# Patient Record
Sex: FEMALE | Race: WHITE | NOT HISPANIC OR LATINO | Employment: FULL TIME | URBAN - METROPOLITAN AREA
[De-identification: names, ages, dates, MRNs, and addresses within clinical notes are randomized per-mention and may not be internally consistent; named-entity substitution may affect disease eponyms.]

---

## 2018-03-27 NOTE — PROGRESS NOTES
Subjective:     Encounter Date:03/28/2018    Patient ID: Kandice Manning is a 57 y.o. 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    HEALTHCARE PROVIDER: MARIANA Cartagena  INFECTIOUS DISEASE: Liborio Cao MD  GASTROENTEROLOGIST: Steve Mccain MD    Chief Complaint:   Chief Complaint   Patient presents with   • Dizziness   • Irregular Heart Beat   • Palpitations     Problem List:  1. Intermittent palpitations with semi-recent decongestant use, mild caffeine use, residual class I symptoms with normal EKG  2. Hyperlipidemia; ASCVD 10 year risk 1.9%, 1.7% if treated  3. IBS  4. Chronic H pylori infections  5. GERD  6. Former tobacco abuse; 1 pack per week ×3 years, quit in 1998  7. Plantar fasciitis  8. Situational stress  9. Surgical history: WILL    Allergies   Allergen Reactions   • Sulfa Antibiotics Other (See Comments)     Pass out   • Tylenol [Acetaminophen] Other (See Comments)     Pass out         Current Outpatient Prescriptions:   •  cetirizine (zyrTEC) 10 MG tablet, Take 25 mg by mouth Daily., Disp: , Rfl:   •  DONNATAL 16.2 MG per tablet, , Disp: , Rfl: 0  •  fluticasone (FLONASE) 50 MCG/ACT nasal spray, SHAKE LQ AND U 1 SPR IEN QD, Disp: , Rfl: 5  •  LINZESS 145 MCG capsule capsule, TK 1 C PO D, Disp: , Rfl: 11  •  pantoprazole (PROTONIX) 40 MG EC tablet, Take  by mouth Daily., Disp: , Rfl: 3  •  RaNITidine HCl (ZANTAC PO), Take  by mouth As Needed., Disp: , Rfl:   •  vitamin B-6 (PYRIDOXINE) 50 MG tablet, Take 50 mg by mouth Daily., Disp: , Rfl:   •  vitamin E 400 UNIT capsule, Take 400 Units by mouth Daily., Disp: , Rfl:     History of Present Illness  This is a 57-year-old white female who presents to establish cardiology care today for intermittent palpitations.  Her palpitations last just a few seconds and are associated with mild dizziness.  She has experienced these for the past 3-4 weeks.  She denies any chest pain, shortness of breath,  presyncope, syncope, edema, COPD, asthma, MIs, heart catheterizations, heart monitors, cardiomegaly, valvular dysfunction, or thyroid dysfunction.  She drinks 3 cups of coffee daily and semi-recently has taken a decongestant for sinus related issues.  She is very active and walks at least 5 miles per day without difficulties.  She has had increased stress in her life for the past few months.  Her mother passed away after having a 77-day hospitalization in December 2017, her brother passed away from a heart attack in January 2018, and her other brother, who has a history of colon cancer, was just diagnosed with metastatic colon cancer to the lung as well.  She states that she is unsure whether her symptoms are related to anxiety from all the stress or if they are heart related.  She has been told she has a heart murmur in the past.  She denies any hypertension, hyperlipidemia, or type 2 diabetes mellitus.  With her only pregnancy, she had some PIH but denies any preeclampsia or gestational diabetes.  She has GERD, as well as chronic H pylori.  She states that she cannot get rid of H. pylori and is seen by Dr. Cao. She has stopped taking Donnatal and has not noticed as many palpitations.  Patient otherwise denies chest pain, shortness of breath, PND, edema, palpitations, syncope or presyncope at this time.    Cardiovascular Disease Risk Factors  family history, increased age    Social History     Social History   • Marital status: Significant Other     Spouse name: N/A   • Number of children: 1   • Years of education: N/A     Occupational History   • Not on file.     Social History Main Topics   • Smoking status: Former Smoker     Packs/day: 0.25     Types: Cigarettes     Quit date: 3/28/1998   • Smokeless tobacco: Never Used   • Alcohol use No   • Drug use: No   • Sexual activity: Defer     Other Topics Concern   • Not on file     Social History Narrative   • No narrative on file       Family History   Problem  "Relation Age of Onset   • Heart disease Mother    • Heart disease Father    • Heart attack Brother    • Heart disease Other    • Heart attack Other    · Mother: , CABG, atrial fibrillation  · Father: CHF, CABG  · Brother:  from MI at 47  · Brother:  at 63 from MI  · Brother: Colon and lung cancer  · Daughter: Healthy    Review of Systems   Constitution: Positive for diaphoresis and malaise/fatigue.   HENT: Negative.    Eyes: Negative.    Cardiovascular: Positive for irregular heartbeat and palpitations. Negative for chest pain, claudication, dyspnea on exertion, leg swelling, near-syncope, orthopnea and syncope.   Respiratory: Negative for shortness of breath and sleep disturbances due to breathing.    Endocrine: Negative.    Hematologic/Lymphatic: Negative.    Skin: Negative.    Musculoskeletal: Negative.    Gastrointestinal: Positive for heartburn.        IBS, chronic h pylori infections   Neurological: Positive for light-headedness. Negative for dizziness and seizures.   Psychiatric/Behavioral: The patient is nervous/anxious.    Allergic/Immunologic: Positive for environmental allergies.      Obtained and negative except as outlined in problem list and HPI.    Procedures None       Objective:       Vitals:    18 0855 18 0904 18 0905 18 0906   BP: 128/83 129/70 131/74 146/80   BP Location: Right arm Right arm Left arm Left arm   Patient Position: Sitting Standing Standing Standing   Pulse: 79 83 82 88   Weight: 62.1 kg (137 lb)      Height: 160 cm (63\")        Body mass index is 24.27 kg/m².     Physical Exam   Constitutional: She is oriented to person, place, and time. She appears well-developed and well-nourished.   HENT:   Mouth/Throat: Uvula is midline, oropharynx is clear and moist and mucous membranes are normal. She has dentures (upper). No oral lesions. Abnormal dentition. No dental abscesses, uvula swelling, lacerations or dental caries.   Lower anterior " teeth in acceptable repair   Eyes:   Fundoscopic exam:       The right eye shows no AV nicking, no exudate and no hemorrhage.        The left eye shows no AV nicking, no exudate and no hemorrhage.   Neck: No JVD present. Carotid bruit is not present. No thyromegaly present.   Cardiovascular: Regular rhythm, S1 normal and S2 normal.  Exam reveals no gallop, no S3 and no friction rub.    Murmur heard.   Medium-pitched harsh early systolic murmur is present with a grade of 1/6  at the upper right sternal border  Pulses:       Dorsalis pedis pulses are 1+ on the right side, and 1+ on the left side.        Posterior tibial pulses are 1+ on the right side, and 1+ on the left side.   Pulmonary/Chest: Effort normal and breath sounds normal. She has no wheezes. She has no rhonchi. She has no rales.   Abdominal: Soft. She exhibits no mass. There is no hepatosplenomegaly. There is no tenderness. There is no guarding.   Bowel sounds audible x4   Musculoskeletal: Normal range of motion. She exhibits no edema.   Lymphadenopathy:     She has no cervical adenopathy.   Neurological: She is alert and oriented to person, place, and time.   Skin: Skin is warm, dry and intact. No rash noted.   Vitals reviewed.      Lab Review:   · ECG 3/1/18: Normal sinus rhythm, normal ECG, 68 bpm, QRS 92 ms,  ms,  ms  · CBC: WBC 5.1, RBC 4.48, hemoglobin 14.3, hematocrit 42.6, MCV 85, MCH 32, MCHC 34, RDW 12, MPV 9.1, platelets 227, neutrophils 62.9, lymphocytes 24.5, monocytes 10.1, eos 1.6, basos 0.9  · Vitamin D 55  · Lipid panel: HDL 76, triglycerides 55, cholesterol 201,   · CMP: Glucose 96, BNP 13, creatinine 0.72, GFR 93, sodium 145, potassium 4.5, chloride 108, carbon dioxide 24, calcium 9.8, protein 7, albumin 4.5, globulin 2.3, bilirubin 0.8, alkaline phosphatase 50, AST 21, AST 17      Assessment:       Patient with intermittent palpitations associated with dizziness and strong family history of early CAD. We will order  an exercise stress test and an echocardiogram, and place a ZioXT after her stress test and echo are complete to assess for any arrhythmias, pauses, or PAF. We will start nebivolol 2.5 mg daily for palpitations.      Diagnosis Plan   1. Palpitations  Adult Transthoracic Echo Complete W/ Cont if Necessary Per Protocol    Holter Monitor - 72 Hour Up To 21 Days    Treadmill Stress Test   2. Mixed hyperlipidemia  Healthy heart diet stressed and recommended   3. Family history of early CAD  Screening studies as outlined above; defer cardiac CT calcium score at this time          Plan:   1. Patient to continue current medications and close follow up with the above providers.  2. Tentative cardiology follow up in 6 weeks or patient may return sooner PRN.  3. Echocardiogram and stress test ordered  4. ZioXT  5. Nebivolol 2.5 mg daily  6. 1 800 card provided    Scribed for Tony Dominguez MD by Lizette Quinn, MARIANA. 3/28/2018  10:14 AM    I, Tony Dominguez MD, [facility], personally performed the services described in this documentation as scribed by the above named individual in my presence, and it is both accurate and complete. At 10:42 AM on 03/28/2018

## 2018-03-28 ENCOUNTER — CONSULT (OUTPATIENT)
Dept: CARDIOLOGY | Facility: CLINIC | Age: 58
End: 2018-03-28

## 2018-03-28 VITALS
SYSTOLIC BLOOD PRESSURE: 146 MMHG | HEIGHT: 63 IN | WEIGHT: 137 LBS | DIASTOLIC BLOOD PRESSURE: 80 MMHG | HEART RATE: 88 BPM | BODY MASS INDEX: 24.27 KG/M2

## 2018-03-28 DIAGNOSIS — R00.2 PALPITATIONS: Primary | ICD-10-CM

## 2018-03-28 DIAGNOSIS — E78.2 MIXED HYPERLIPIDEMIA: ICD-10-CM

## 2018-03-28 DIAGNOSIS — Z82.49 FAMILY HISTORY OF EARLY CAD: ICD-10-CM

## 2018-03-28 PROBLEM — K21.9 GASTROESOPHAGEAL REFLUX DISEASE WITHOUT ESOPHAGITIS: Status: ACTIVE | Noted: 2018-03-28

## 2018-03-28 PROCEDURE — 99204 OFFICE O/P NEW MOD 45 MIN: CPT | Performed by: INTERNAL MEDICINE

## 2018-03-28 RX ORDER — LINACLOTIDE 145 UG/1
CAPSULE, GELATIN COATED ORAL
Refills: 11 | COMMUNITY
Start: 2018-02-21

## 2018-03-28 RX ORDER — CETIRIZINE HYDROCHLORIDE 10 MG/1
25 TABLET ORAL AS NEEDED
COMMUNITY

## 2018-03-28 RX ORDER — FLUTICASONE PROPIONATE 50 MCG
SPRAY, SUSPENSION (ML) NASAL
Refills: 5 | COMMUNITY
Start: 2018-03-13

## 2018-03-28 RX ORDER — LANOLIN ALCOHOL/MO/W.PET/CERES
50 CREAM (GRAM) TOPICAL DAILY
COMMUNITY
End: 2019-08-14

## 2018-03-28 RX ORDER — VITAMIN E 268 MG
400 CAPSULE ORAL DAILY
COMMUNITY
End: 2023-03-08 | Stop reason: SDDI

## 2018-03-28 RX ORDER — PANTOPRAZOLE SODIUM 40 MG/1
TABLET, DELAYED RELEASE ORAL DAILY
Refills: 3 | COMMUNITY
Start: 2018-02-05

## 2018-03-28 RX ORDER — NEBIVOLOL 2.5 MG/1
2.5 TABLET ORAL DAILY
Qty: 30 TABLET | Refills: 6 | Status: SHIPPED | OUTPATIENT
Start: 2018-03-28 | End: 2018-04-30 | Stop reason: SDUPTHER

## 2018-03-28 RX ORDER — PHENOBARBITAL, HYOSCYAMINE SULFATE, ATROPINE SULFATE, SCOPOLAMINE HYDROBROMIDE 16.2; .1037; .0194; .0065 MG/1; MG/1; MG/1; MG/1
1 TABLET ORAL AS NEEDED
Refills: 0 | COMMUNITY
Start: 2018-02-01 | End: 2023-03-28

## 2018-04-25 ENCOUNTER — HOSPITAL ENCOUNTER (OUTPATIENT)
Dept: CARDIOLOGY | Facility: HOSPITAL | Age: 58
Discharge: HOME | End: 2018-04-25
Attending: INTERNAL MEDICINE

## 2018-04-25 ENCOUNTER — HOSPITAL ENCOUNTER (OUTPATIENT)
Dept: CARDIOLOGY | Facility: HOSPITAL | Age: 58
Discharge: HOME | End: 2018-04-25
Attending: INTERNAL MEDICINE | Admitting: INTERNAL MEDICINE

## 2018-04-25 VITALS
BODY MASS INDEX: 24.27 KG/M2 | SYSTOLIC BLOOD PRESSURE: 140 MMHG | WEIGHT: 137 LBS | DIASTOLIC BLOOD PRESSURE: 83 MMHG | HEIGHT: 63 IN

## 2018-04-25 DIAGNOSIS — R00.2 PALPITATIONS: ICD-10-CM

## 2018-04-25 LAB
BH CV ECHO MEAS - AO MAX PG (FULL): 0.38 MMHG
BH CV ECHO MEAS - AO MAX PG: 5 MMHG
BH CV ECHO MEAS - AO MEAN PG (FULL): 0.63 MMHG
BH CV ECHO MEAS - AO MEAN PG: 2.7 MMHG
BH CV ECHO MEAS - AO ROOT AREA (BSA CORRECTED): 2
BH CV ECHO MEAS - AO ROOT AREA: 8.4 CM^2
BH CV ECHO MEAS - AO ROOT DIAM: 3.3 CM
BH CV ECHO MEAS - AO V2 MAX: 112.9 CM/SEC
BH CV ECHO MEAS - AO V2 MEAN: 74.4 CM/SEC
BH CV ECHO MEAS - AO V2 VTI: 22.3 CM
BH CV ECHO MEAS - AVA(I,A): 2.8 CM^2
BH CV ECHO MEAS - AVA(I,D): 2.8 CM^2
BH CV ECHO MEAS - AVA(V,A): 2.8 CM^2
BH CV ECHO MEAS - AVA(V,D): 2.8 CM^2
BH CV ECHO MEAS - BSA(HAYCOCK): 1.7 M^2
BH CV ECHO MEAS - BSA: 1.6 M^2
BH CV ECHO MEAS - BZI_BMI: 24.3 KILOGRAMS/M^2
BH CV ECHO MEAS - BZI_METRIC_HEIGHT: 160 CM
BH CV ECHO MEAS - BZI_METRIC_WEIGHT: 62.1 KG
BH CV ECHO MEAS - EDV(CUBED): 69.3 ML
BH CV ECHO MEAS - EDV(TEICH): 74.5 ML
BH CV ECHO MEAS - EF(CUBED): 77.1 %
BH CV ECHO MEAS - EF(TEICH): 69.7 %
BH CV ECHO MEAS - ESV(CUBED): 15.8 ML
BH CV ECHO MEAS - ESV(TEICH): 22.6 ML
BH CV ECHO MEAS - FS: 38.9 %
BH CV ECHO MEAS - IVS/LVPW: 1.1
BH CV ECHO MEAS - IVSD: 0.9 CM
BH CV ECHO MEAS - LA DIMENSION: 2.8 CM
BH CV ECHO MEAS - LA/AO: 0.85
BH CV ECHO MEAS - LAT PEAK E' VEL: 11 CM/SEC
BH CV ECHO MEAS - LV MASS(C)D: 109.9 GRAMS
BH CV ECHO MEAS - LV MASS(C)DI: 66.7 GRAMS/M^2
BH CV ECHO MEAS - LV MAX PG: 4.6 MMHG
BH CV ECHO MEAS - LV MEAN PG: 2 MMHG
BH CV ECHO MEAS - LV V1 MAX: 107.5 CM/SEC
BH CV ECHO MEAS - LV V1 MEAN: 64.6 CM/SEC
BH CV ECHO MEAS - LV V1 VTI: 20.9 CM
BH CV ECHO MEAS - LVIDD: 4.1 CM
BH CV ECHO MEAS - LVIDS: 2.5 CM
BH CV ECHO MEAS - LVOT AREA (M): 2.8 CM^2
BH CV ECHO MEAS - LVOT AREA: 3 CM^2
BH CV ECHO MEAS - LVOT DIAM: 1.9 CM
BH CV ECHO MEAS - LVPWD: 0.9 CM
BH CV ECHO MEAS - MED PEAK E' VEL: 7.58 CM/SEC
BH CV ECHO MEAS - MV A MAX VEL: 71.3 CM/SEC
BH CV ECHO MEAS - MV E MAX VEL: 85 CM/SEC
BH CV ECHO MEAS - MV E/A: 1.2
BH CV ECHO MEAS - PA ACC SLOPE: 842 CM/SEC^2
BH CV ECHO MEAS - PA ACC TIME: 0.13 SEC
BH CV ECHO MEAS - PA MAX PG: 5 MMHG
BH CV ECHO MEAS - PA PR(ACCEL): 20.4 MMHG
BH CV ECHO MEAS - PA V2 MAX: 111.8 CM/SEC
BH CV ECHO MEAS - RAP SYSTOLE: 3 MMHG
BH CV ECHO MEAS - RVSP: 26 MMHG
BH CV ECHO MEAS - SI(AO): 113.8 ML/M^2
BH CV ECHO MEAS - SI(CUBED): 32.5 ML/M^2
BH CV ECHO MEAS - SI(LVOT): 37.7 ML/M^2
BH CV ECHO MEAS - SI(TEICH): 31.6 ML/M^2
BH CV ECHO MEAS - SV(AO): 187.4 ML
BH CV ECHO MEAS - SV(CUBED): 53.4 ML
BH CV ECHO MEAS - SV(LVOT): 62 ML
BH CV ECHO MEAS - SV(TEICH): 51.9 ML
BH CV ECHO MEAS - TAPSE (>1.6): 3 CM2
BH CV ECHO MEAS - TR MAX VEL: 240.9 CM/SEC
BH CV ECHO MEASUREMENTS AVERAGE E/E' RATIO: 9.15
BH CV STRESS BP STAGE 1: NORMAL
BH CV STRESS BP STAGE 2: NORMAL
BH CV STRESS BP STAGE 3: NORMAL
BH CV STRESS BP STAGE 4: NORMAL
BH CV STRESS DURATION MIN STAGE 1: 3
BH CV STRESS DURATION MIN STAGE 2: 3
BH CV STRESS DURATION MIN STAGE 3: 3
BH CV STRESS DURATION SEC STAGE 1: 0
BH CV STRESS DURATION SEC STAGE 2: 0
BH CV STRESS DURATION SEC STAGE 3: 0
BH CV STRESS DURATION SEC STAGE 4: 27
BH CV STRESS GRADE STAGE 1: 10
BH CV STRESS GRADE STAGE 2: 12
BH CV STRESS GRADE STAGE 3: 14
BH CV STRESS GRADE STAGE 4: 16
BH CV STRESS HR STAGE 1: 114
BH CV STRESS HR STAGE 2: 139
BH CV STRESS HR STAGE 3: 157
BH CV STRESS HR STAGE 4: 164
BH CV STRESS METS STAGE 1: 5
BH CV STRESS METS STAGE 2: 7.5
BH CV STRESS METS STAGE 3: 10
BH CV STRESS METS STAGE 4: 13.5
BH CV STRESS PROTOCOL 1: NORMAL
BH CV STRESS RECOVERY BP: NORMAL MMHG
BH CV STRESS RECOVERY HR: 96 BPM
BH CV STRESS SPEED STAGE 1: 1.7
BH CV STRESS SPEED STAGE 2: 2.5
BH CV STRESS SPEED STAGE 3: 3.4
BH CV STRESS SPEED STAGE 4: 4.2
BH CV STRESS STAGE 1: 1
BH CV STRESS STAGE 2: 2
BH CV STRESS STAGE 3: 3
BH CV STRESS STAGE 4: 4
BH CV VAS BP RIGHT ARM: NORMAL MMHG
BH CV XLRA - RV BASE: 2.7 CM
BH CV XLRA - RV LENGTH: 6.6 CM
BH CV XLRA - RV MID: 2.8 CM
BH CV XLRA - TDI S': 17.1 CM/SEC
LEFT ATRIUM VOLUME INDEX: 19.4 ML/M2
LV EF 2D ECHO EST: 68 %
MAXIMAL PREDICTED HEART RATE: 162 BPM
MAXIMAL PREDICTED HEART RATE: 162 BPM
PERCENT MAX PREDICTED HR: 104.32 %
STRESS BASELINE BP: NORMAL MMHG
STRESS BASELINE HR: 76 BPM
STRESS PERCENT HR: 123 %
STRESS POST ESTIMATED WORKLOAD: 10.8 METS
STRESS POST EXERCISE DUR MIN: 9 MIN
STRESS POST EXERCISE DUR SEC: 27 SEC
STRESS POST PEAK BP: NORMAL MMHG
STRESS POST PEAK HR: 169 BPM
STRESS TARGET HR: 138 BPM
STRESS TARGET HR: 138 BPM

## 2018-04-25 PROCEDURE — 93306 TTE W/DOPPLER COMPLETE: CPT | Performed by: INTERNAL MEDICINE

## 2018-04-25 PROCEDURE — 93306 TTE W/DOPPLER COMPLETE: CPT

## 2018-04-25 PROCEDURE — 93017 CV STRESS TEST TRACING ONLY: CPT

## 2018-04-25 PROCEDURE — 93018 CV STRESS TEST I&R ONLY: CPT | Performed by: INTERNAL MEDICINE

## 2018-04-30 ENCOUNTER — TELEPHONE (OUTPATIENT)
Dept: CARDIOLOGY | Facility: CLINIC | Age: 58
End: 2018-04-30

## 2018-04-30 RX ORDER — NEBIVOLOL 2.5 MG/1
2.5 TABLET ORAL DAILY
Qty: 90 TABLET | Refills: 3 | Status: SHIPPED | OUTPATIENT
Start: 2018-04-30 | End: 2018-05-16

## 2018-05-16 ENCOUNTER — TELEPHONE (OUTPATIENT)
Dept: CARDIOLOGY | Facility: CLINIC | Age: 58
End: 2018-05-16

## 2018-05-16 RX ORDER — ACEBUTOLOL HYDROCHLORIDE 200 MG/1
200 CAPSULE ORAL DAILY
Qty: 30 CAPSULE | Refills: 11 | Status: SHIPPED | OUTPATIENT
Start: 2018-05-16 | End: 2018-05-29

## 2018-05-16 NOTE — TELEPHONE ENCOUNTER
Patient called with complaints of her hair falling out since starting Bystolic 2.5 mg and wants to know if she can be changed something else.  She has an appointment 5/29/18.

## 2018-05-29 ENCOUNTER — LAB (OUTPATIENT)
Dept: LAB | Facility: HOSPITAL | Age: 58
End: 2018-05-29

## 2018-05-29 ENCOUNTER — OFFICE VISIT (OUTPATIENT)
Dept: CARDIOLOGY | Facility: CLINIC | Age: 58
End: 2018-05-29

## 2018-05-29 VITALS
WEIGHT: 136 LBS | SYSTOLIC BLOOD PRESSURE: 136 MMHG | BODY MASS INDEX: 24.1 KG/M2 | HEIGHT: 63 IN | HEART RATE: 67 BPM | DIASTOLIC BLOOD PRESSURE: 78 MMHG

## 2018-05-29 DIAGNOSIS — E78.2 MIXED HYPERLIPIDEMIA: ICD-10-CM

## 2018-05-29 DIAGNOSIS — K21.9 GASTROESOPHAGEAL REFLUX DISEASE WITHOUT ESOPHAGITIS: ICD-10-CM

## 2018-05-29 DIAGNOSIS — R53.83 OTHER FATIGUE: Primary | ICD-10-CM

## 2018-05-29 DIAGNOSIS — R00.2 PALPITATIONS: ICD-10-CM

## 2018-05-29 DIAGNOSIS — R53.83 OTHER FATIGUE: ICD-10-CM

## 2018-05-29 LAB — VIT B12 BLD-MCNC: 461 PG/ML (ref 211–911)

## 2018-05-29 PROCEDURE — 82607 VITAMIN B-12: CPT

## 2018-05-29 PROCEDURE — 99214 OFFICE O/P EST MOD 30 MIN: CPT | Performed by: INTERNAL MEDICINE

## 2018-05-29 PROCEDURE — 36415 COLL VENOUS BLD VENIPUNCTURE: CPT

## 2018-05-29 RX ORDER — NEBIVOLOL 2.5 MG/1
2.5 TABLET ORAL DAILY
COMMUNITY
End: 2019-05-22 | Stop reason: SDUPTHER

## 2018-05-29 NOTE — PROGRESS NOTES
Subjective:     Encounter Date:05/29/2018    Patient ID: Kandice Manning is a 58 y.o. 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     HEALTHCARE PROVIDER: MARIANA Cartagena  INFECTIOUS DISEASE: Liborio Cao MD  GASTROENTEROLOGIST: Steve Mccain MD    Chief Complaint:   Chief Complaint   Patient presents with   • Palpitations   • Hyperlipidemia   • Dizziness     Problem List:  1. Intermittent palpitations with semi-recent decongestant use, mild caffeine use, residual class I symptoms with normal EKG   A. Acceptable negative GXT following exercise to 104% PMHR and 128% predicted exercise capacity, April 2018   B. Echocardiogram demonstrating mild MR and TR without pulmonary arterial hypertension or pericardial effusion or important valvular heart disease with normal LVEF (0.68), April 2018   C. Residual class I symptoms  2. Hyperlipidemia; ASCVD 10-year risk 1.9%, 1.7% if treated  3. IBS  4. Chronic H pylori infections  5. GERD  6. Former tobacco abuse; 1 pack per week ×3 years, quit in 1998  7. Plantar fasciitis  8. Situational stress  9. Surgical history: WILL     Allergies   Allergen Reactions   • Sulfa Antibiotics Other (See Comments)     Pass out   • Tylenol [Acetaminophen] Other (See Comments)     Pass out         Current Outpatient Prescriptions:   •  cetirizine (zyrTEC) 10 MG tablet, Take 25 mg by mouth Daily., Disp: , Rfl:   •  fluticasone (FLONASE) 50 MCG/ACT nasal spray, SHAKE LQ AND U 1 SPR IEN QD, Disp: , Rfl: 5  •  LINZESS 145 MCG capsule capsule, TK 1 C PO D, Disp: , Rfl: 11  •  nebivolol (BYSTOLIC) 2.5 MG tablet, Take 2.5 mg by mouth Daily., Disp: , Rfl:   •  pantoprazole (PROTONIX) 40 MG EC tablet, Take  by mouth Daily., Disp: , Rfl: 3  •  RaNITidine HCl (ZANTAC PO), Take  by mouth As Needed., Disp: , Rfl:   •  vitamin B-6 (PYRIDOXINE) 50 MG tablet, Take 50 mg by mouth Daily., Disp: , Rfl:   •  vitamin E 400 UNIT capsule, Take 400 Units by  "mouth Daily., Disp: , Rfl:     HISTORY OF PRESENT ILLNESS: Patient returns for scheduled followup after a 2-month hiatus. She asks for interpretation of the letters sent to her regarding her tests, and this is discussed with her.  She says that she did not get to do the monitor because it was going to cost her $80 copay per day.  She has not tried the Sectral yet because of the multiple side effects; she is worried about \"excessive weight gain\" because she used to weigh 170 pounds and does not want to go back to that weight.  She wonders if she has a vitamin B12 deficiency.  She has not had anymore symptoms and states that she quit taking the Donnatal for her stomach and that her symptoms have since improved.  Patient otherwise denies chest pain, shortness of breath, PND, edema, palpitations, syncope or presyncope at this time.  She works full time daily and is asymptomatic but does request a vitamin B12 level.        Review of Systems   Constitution: Positive for malaise/fatigue.   Cardiovascular: Positive for leg swelling.      Obtained and otherwise negative except as outlined in problem list and HPI.    Procedures       Objective:       Vitals:    05/29/18 1036 05/29/18 1038 05/29/18 1051   BP: 150/79 142/76 136/78   BP Location: Left arm Left arm Left arm   Patient Position: Sitting Standing Sitting   Pulse: 64 67    Weight: 61.7 kg (136 lb) 61.7 kg (136 lb)    Height: 160 cm (63\") 160 cm (63\")      Body mass index is 24.09 kg/m².   Last weight:  137 lbs.    Physical Exam   Constitutional: She is oriented to person, place, and time. She appears well-developed and well-nourished.   Neck: No JVD present. Carotid bruit is not present. No thyromegaly present.   Cardiovascular: Regular rhythm, S1 normal, S2 normal and normal heart sounds.  Exam reveals no gallop, no S3 and no friction rub.    No murmur heard.  Pulses:       Dorsalis pedis pulses are 2+ on the right side, and 2+ on the left side.        Posterior " tibial pulses are 2+ on the right side, and 2+ on the left side.   Pulmonary/Chest: Effort normal and breath sounds normal. She has no wheezes. She has no rhonchi. She has no rales.   Abdominal: Soft. She exhibits no mass. There is no hepatosplenomegaly. There is no tenderness. There is no guarding.   Bowel sounds audible x4   Musculoskeletal: Normal range of motion. She exhibits no edema.   Lymphadenopathy:     She has no cervical adenopathy.   Neurological: She is alert and oriented to person, place, and time.   Skin: Skin is warm, dry and intact. No rash noted.   Vitals reviewed.        Lab Review: No recent laboratory studies available for review today          Assessment:   Overall continued acceptable course with no interim cardiopulmonary complaints with good functional status. We will defer additional diagnostic or therapeutic intervention from a cardiac perspective at this time.       Diagnosis Plan   1. Other fatigue  Vitamin B12   2. Palpitations  Acceptable control on nebivolol   3. Mixed hyperlipidemia  No new data to review   4. Gastroesophageal reflux disease without esophagitis  Stable clinical course currently          Plan:         1. Patient to continue current medications and close follow up with the above providers.  2. Vitamin B12 level ordered  3. Tentative cardiology follow up in November 2018, or patient may return sooner PRN.    Transcribed by Dayana Mckinley for Dr. Tony Dominguez at 10:51 AM on 05/29/2018    ITony MD, [facility], personally performed the services described in this documentation as scribed by the above named individual in my presence, and it is both accurate and complete. At 1:04 PM on 05/29/2018

## 2018-11-05 ENCOUNTER — TELEPHONE (OUTPATIENT)
Dept: CARDIOLOGY | Facility: CLINIC | Age: 58
End: 2018-11-05

## 2018-11-05 NOTE — TELEPHONE ENCOUNTER
Called and stated she needed gallbladder surgery but when I called back there was not answer.  Asked her to return my call for more info regarding her surgery.

## 2018-12-12 ENCOUNTER — OFFICE VISIT (OUTPATIENT)
Dept: CARDIOLOGY | Facility: CLINIC | Age: 58
End: 2018-12-12

## 2018-12-12 VITALS
HEIGHT: 63 IN | BODY MASS INDEX: 24.17 KG/M2 | DIASTOLIC BLOOD PRESSURE: 71 MMHG | HEART RATE: 82 BPM | SYSTOLIC BLOOD PRESSURE: 119 MMHG | WEIGHT: 136.4 LBS

## 2018-12-12 DIAGNOSIS — R00.2 PALPITATIONS: Primary | ICD-10-CM

## 2018-12-12 DIAGNOSIS — Z82.49 FAMILY HISTORY OF EARLY CAD: ICD-10-CM

## 2018-12-12 DIAGNOSIS — E78.2 MIXED HYPERLIPIDEMIA: ICD-10-CM

## 2018-12-12 PROCEDURE — 99214 OFFICE O/P EST MOD 30 MIN: CPT | Performed by: INTERNAL MEDICINE

## 2018-12-12 NOTE — PROGRESS NOTES
Subjective:     Encounter Date:12/12/2018    Patient ID: Kandice Manning is a 58 y.o. 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     HEALTHCARE PROVIDER: MARIANA Cartagena  INFECTIOUS DISEASE: Liborio Cao MD  GASTROENTEROLOGIST: Steve Mccain MD  GENERAL SURGEON: Rodríguez Villatoro MD    Chief Complaint:   Chief Complaint   Patient presents with   • Fatigue     Problem List:  1. Intermittent palpitations with decongestant use, mild caffeine use, residual class I symptoms, with normal EKG:  a. Acceptable negative GXT following exercise to 104% PMHR and 128% predicted exercise capacity, April 2018  b. Echocardiogram demonstrating mild MR and TR without pulmonary arterial hypertension or pericardial effusion or important valvular heart disease with normal LVEF (0.68), April 2018  c. Residual class I symptoms  2. Hyperlipidemia; ASCVD 10-year risk 1.9%, 1.7% if treated  3. IBS  4. Chronic H pylori infections  5. GERD  6. Former tobacco abuse; 1 pack per week ×3 years, quit in 1998  7. Plantar fasciitis  8. Situational stress  9. Right upper quadrant pain with HIDA scan demonstrating 17% function fall 2018  10. Surgical history:   a. WILL  b. Cholecystectomy, 12/4/2018     Allergies   Allergen Reactions   • Sulfa Antibiotics Other (See Comments)     Pass out   • Tylenol [Acetaminophen] Other (See Comments)     Pass out         Current Outpatient Medications:   •  cetirizine (zyrTEC) 10 MG tablet, Take 25 mg by mouth Daily., Disp: , Rfl:   •  DONNATAL 16.2 MG per tablet, , Disp: , Rfl: 0  •  fluticasone (FLONASE) 50 MCG/ACT nasal spray, SHAKE LQ AND U 1 SPR IEN QD, Disp: , Rfl: 5  •  LINZESS 145 MCG capsule capsule, TK 1 C PO D, Disp: , Rfl: 11  •  nebivolol (BYSTOLIC) 2.5 MG tablet, Take 2.5 mg by mouth Daily., Disp: , Rfl:   •  pantoprazole (PROTONIX) 40 MG EC tablet, Take  by mouth Daily., Disp: , Rfl: 3  •  RaNITidine HCl (ZANTAC PO), Take  by mouth As  "Needed., Disp: , Rfl:   •  vitamin B-6 (PYRIDOXINE) 50 MG tablet, Take 50 mg by mouth Daily., Disp: , Rfl:   •  vitamin E 400 UNIT capsule, Take 400 Units by mouth Daily., Disp: , Rfl:     HISTORY OF PRESENT ILLNESS:  Patient is here for a 7 month follow-up. She denies chest pain, SOB, palpitations, presyncope, syncope, or edema.  She feels that Bystolic has controlled her palpitations.  She was having some right upper quadrant pain with associated nausea and difficulty eating foods.  She had a HIDA scan which demonstrated 17% functioning and had a cholecystectomy on 2018 with Dr. Villatoro at [facility].  She went through surgery with no complications and has some mild abdominal distention, tenderness, and residual gas since her surgery.  She lost her mother, father, and brother this year and has had increased situational stress as a result.  She still has a lack of appetite after her surgery, but she feels that her nausea has improved.  Her mother  of complications of chronic ischemic heart disease and congestive heart failure, her brother  of sudden cardiac death, and her father  in 2018 of complications of severe ischemic cardiomyopathy and COPD with apparent sepsis, confusion, and abdominal ischemia.      Review of Systems   Musculoskeletal: Positive for back pain.      Obtained and otherwise negative except as outlined in problem list and HPI.    Procedures       Objective:       Vitals:    18 1542 18 1543   BP: 118/69 119/71   BP Location: Left arm Left arm   Patient Position: Sitting Standing   Pulse: 83 82   Weight: 61.9 kg (136 lb 6.4 oz)    Height: 160 cm (63\")      Body mass index is 24.16 kg/m².  Last weight May 2018 was 136 pounds    Physical Exam   Constitutional: She is oriented to person, place, and time. She appears well-developed and well-nourished.   Neck: No JVD present. Carotid bruit is not present. No thyromegaly present.   Cardiovascular: Regular " rhythm, S1 normal, S2 normal and normal heart sounds. Exam reveals no gallop, no S3 and no friction rub.   No murmur heard.  Pulses:       Dorsalis pedis pulses are 2+ on the right side, and 2+ on the left side.        Posterior tibial pulses are 2+ on the right side, and 2+ on the left side.   Pulmonary/Chest: Effort normal and breath sounds normal. She has no wheezes. She has no rhonchi. She has no rales.   Abdominal: Soft. She exhibits no mass. There is no hepatosplenomegaly. There is no tenderness. There is no guarding.       Bowel sounds audible x4, laparoscopic surgical incisions CDI   Musculoskeletal: Normal range of motion. She exhibits no edema.   Lymphadenopathy:     She has no cervical adenopathy.   Neurological: She is alert and oriented to person, place, and time.   Skin: Skin is warm, dry and intact. No rash noted.   Vitals reviewed.        Lab Review:   5/29/18: Vitamin B12 - 461      Assessment:       Overall continued acceptable course with no interim cardiopulmonary complaints with acceptable functional status. We will defer additional diagnostic or therapeutic intervention from a cardiac perspective at this time.  She is recovering from her recent cholecystectomy on 12/4/2018. She also has situational stress because she lost her father, mother, and brother this year.       Diagnosis Plan   1. Palpitations  Stable   2. Mixed hyperlipidemia  No new labs to review   3. Family history of early CAD  Stable          Plan:         1. Patient to continue current medications and close follow up with the above providers.  2. Tentative cardiology follow up in August 2019, or patient may return sooner PRN.    Scribed for Tony Dominguez MD by Lizette Quinn,APRN. 12/12/2018  4:00 PM    ITony MD, [facility], personally performed the services described in this documentation as scribed by the above named individual in my presence, and it is both accurate and complete. At 4:12 PM on 12/12/2018

## 2019-05-22 RX ORDER — NEBIVOLOL 2.5 MG/1
2.5 TABLET ORAL DAILY
Qty: 90 TABLET | Refills: 3 | Status: SHIPPED | OUTPATIENT
Start: 2019-05-22 | End: 2019-08-14 | Stop reason: SDUPTHER

## 2019-08-14 ENCOUNTER — OFFICE VISIT (OUTPATIENT)
Dept: CARDIOLOGY | Facility: CLINIC | Age: 59
End: 2019-08-14

## 2019-08-14 VITALS
SYSTOLIC BLOOD PRESSURE: 126 MMHG | BODY MASS INDEX: 23.83 KG/M2 | HEIGHT: 63 IN | WEIGHT: 134.5 LBS | HEART RATE: 64 BPM | DIASTOLIC BLOOD PRESSURE: 76 MMHG

## 2019-08-14 DIAGNOSIS — Z82.49 FAMILY HISTORY OF EARLY CAD: ICD-10-CM

## 2019-08-14 DIAGNOSIS — R00.2 PALPITATIONS: Primary | ICD-10-CM

## 2019-08-14 DIAGNOSIS — E78.5 DYSLIPIDEMIA: ICD-10-CM

## 2019-08-14 PROCEDURE — 99214 OFFICE O/P EST MOD 30 MIN: CPT | Performed by: INTERNAL MEDICINE

## 2019-08-14 RX ORDER — NEBIVOLOL 2.5 MG/1
2.5 TABLET ORAL DAILY
Qty: 90 TABLET | Refills: 3 | Status: SHIPPED | OUTPATIENT
Start: 2019-08-14 | End: 2020-09-14 | Stop reason: SDUPTHER

## 2019-08-14 NOTE — PROGRESS NOTES
Subjective:     Encounter Date:08/14/2019    Patient ID: Kandice Manning is a 59 y.o. 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     HEALTHCARE PROVIDER: MARIANA Cartagena  INFECTIOUS DISEASE: Liborio Cao MD  GASTROENTEROLOGIST: Steve Mccain MD  GENERAL SURGEON: Rodríguez Villatoro MD    Chief Complaint:   Chief Complaint   Patient presents with   • Palpitations     Problem List:  1. Intermittent palpitations with decongestant use, mild caffeine use, residual class I symptoms, with normal EKG:  a. Acceptable negative GXT following exercise to 104% PMHR and 128% predicted exercise capacity, April 2018  b. Echocardiogram demonstrating mild MR and TR without pulmonary arterial hypertension or pericardial effusion or important valvular heart disease with normal LVEF (0.68), April 2018  c. Residual class I symptoms  2. Hyperlipidemia; ASCVD 10-year risk 1.9%, 1.7% if treated  3. IBS  4. Chronic H. pylori infections  5. GERD  6. Former tobacco abuse; 1 pack per week ×3 years, quit in 1998  7. Plantar fasciitis  8. Situational stress with depression related to losing both parents and her brother within a 6-month period in 2018  9. Right upper quadrant pain with HIDA scan demonstrating 17% function fall 2018  10. Surgical history:   a. WILL  b. Cholecystectomy, 12/4/2018       Allergies   Allergen Reactions   • Sulfa Antibiotics Other (See Comments)     Pass out   • Tylenol [Acetaminophen] Other (See Comments)     Pass out         Current Outpatient Medications:   •  cetirizine (zyrTEC) 10 MG tablet, Take 25 mg by mouth As Needed., Disp: , Rfl:   •  DONNATAL 16.2 MG per tablet, Take 1 tablet by mouth As Needed., Disp: , Rfl: 0  •  fluticasone (FLONASE) 50 MCG/ACT nasal spray, As needed, Disp: , Rfl: 5  •  LINZESS 145 MCG capsule capsule, TK 1 C PO D, Disp: , Rfl: 11  •  nebivolol (BYSTOLIC) 2.5 MG tablet, Take 1 tablet by mouth Daily., Disp: 90 tablet, Rfl:  3  •  pantoprazole (PROTONIX) 40 MG EC tablet, Take  by mouth Daily., Disp: , Rfl: 3  •  RaNITidine HCl (ZANTAC PO), Take  by mouth As Needed., Disp: , Rfl:   •  vitamin E 400 UNIT capsule, Take 400 Units by mouth Daily., Disp: , Rfl:     HISTORY OF PRESENT ILLNESS: Patient returns for scheduled 8-month followup.  She has done well, other than her irritable bowel syndrome.  She says her doctor put her on Donnatal prn, and it is when she takes it that she feels the palpitations; she has to take it 2-3 times per week.  Her palpitations do not last long enough to make her lightheaded and only last a few seconds.  She has had no ER visits, hospitalizations, or serious illnesses in the interim.  She hdd her gallbladder out in December 2018, and she has no problems with that, other than feeling hungry all the time.  She is able to eat fine and does not have any diarrhea and takes Linzess for constipation; she has some soreness in her stomach from time to time and wonders if this is due to the Linzess.  She says she lost both parents and her brother within a 6-month period this past year, and she went through a time of depression.  She is active on a daily basis and has no symptoms from a cardiopulmonary perspective with her activities.  She states that she has not had any labs drawn recently.  She complains of some swelling in her lower extremities during the summertime.  Patient otherwise denies chest pain, shortness of breath, PND, refractory edema, sustained palpitations, syncope or presyncope at this time.        Review of Systems   Constitution: Positive for night sweats.   Cardiovascular: Positive for leg swelling and palpitations.   Gastrointestinal: Positive for bloating, constipation and heartburn.   Allergic/Immunologic: Positive for environmental allergies.      All other systems reviewed and otherwise negative.    Procedures       Objective:       Vitals:    08/14/19 1514 08/14/19 1520   BP: 123/69 126/76   BP  "Location: Left arm Left arm   Patient Position: Sitting Standing   Pulse: 62 64   Weight: 61 kg (134 lb 8 oz)    Height: 160 cm (63\")      Body mass index is 23.83 kg/m².   Last weight:  136 lbs.    Physical Exam   Constitutional: She is oriented to person, place, and time. She appears well-developed and well-nourished.   Neck: No JVD present. Carotid bruit is not present. No thyromegaly present.   Cardiovascular: Regular rhythm, S1 normal, S2 normal and normal heart sounds. Exam reveals no gallop, no S3 and no friction rub.   No murmur heard.  Pulses:       Dorsalis pedis pulses are 2+ on the right side, and 2+ on the left side.        Posterior tibial pulses are 2+ on the right side, and 2+ on the left side.   Pulmonary/Chest: Effort normal and breath sounds normal. She has no wheezes. She has no rhonchi. She has no rales.   Abdominal: Soft. She exhibits no mass. There is no hepatosplenomegaly. There is no tenderness. There is no guarding.   Bowel sounds audible x4   Musculoskeletal: Normal range of motion. She exhibits edema (trace bipedal).   Lymphadenopathy:     She has no cervical adenopathy.   Neurological: She is alert and oriented to person, place, and time.   Skin: Skin is warm, dry and intact. No rash noted.   Vitals reviewed.        Lab Review: No recent laboratory results available for review today          Assessment:   Overall continued acceptable course with no new interim cardiopulmonary complaints with good functional status. We will defer additional diagnostic or therapeutic intervention from a cardiac perspective at this time.       Diagnosis Plan   1. Palpitations  No sustained symptoms; Continue current treatment.    2. Dyslipidemia  No data to review; continue heart healthy diet   3. Family history of early CAD  No data to review          Plan:         1. Patient to continue current medications and close follow up with the above providers.  2. Tentative cardiology follow up in April 2020, or " patient may return sooner PRN.    Transcribed by Dayana Mckinley for Dr. Tony Dominguez at 3:27 PM on 08/14/2019    I, Tony Dominguez MD, [facility], personally performed the services described in this documentation as scribed by the above named individual in my presence, and it is both accurate and complete. At 3:36 PM on 08/14/2019

## 2020-04-22 ENCOUNTER — OFFICE VISIT (OUTPATIENT)
Dept: CARDIOLOGY | Facility: CLINIC | Age: 60
End: 2020-04-22

## 2020-04-22 VITALS — HEIGHT: 63 IN | BODY MASS INDEX: 24.45 KG/M2 | WEIGHT: 138 LBS

## 2020-04-22 DIAGNOSIS — R00.2 PALPITATIONS: Primary | ICD-10-CM

## 2020-04-22 DIAGNOSIS — K21.9 GASTROESOPHAGEAL REFLUX DISEASE WITHOUT ESOPHAGITIS: ICD-10-CM

## 2020-04-22 DIAGNOSIS — E78.5 DYSLIPIDEMIA: ICD-10-CM

## 2020-04-22 PROCEDURE — 99441 PR PHYS/QHP TELEPHONE EVALUATION 5-10 MIN: CPT | Performed by: INTERNAL MEDICINE

## 2020-04-22 RX ORDER — BUPROPION HYDROCHLORIDE 300 MG/1
TABLET ORAL
COMMUNITY
End: 2020-10-23

## 2020-04-22 RX ORDER — FAMOTIDINE 40 MG/1
TABLET, FILM COATED ORAL
COMMUNITY
End: 2020-10-23 | Stop reason: SDUPTHER

## 2020-04-22 RX ORDER — NITROFURANTOIN 25; 75 MG/1; MG/1
CAPSULE ORAL
COMMUNITY
Start: 2019-11-22

## 2020-04-22 RX ORDER — FAMOTIDINE 40 MG/1
TABLET, FILM COATED ORAL
COMMUNITY
Start: 2020-03-19

## 2020-04-22 NOTE — PROGRESS NOTES
Subjective:     Encounter Date:04/22/2020    Patient ID: Kandice Manning is a 60 y.o. 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     HEALTHCARE PROVIDER: MARIANA Cartagena  INFECTIOUS DISEASE: Liborio Cao MD  GASTROENTEROLOGIST: Steve Mccain MD  GENERAL SURGEON: Rodríguez Villatoro MD    Chief Complaint:   Chief Complaint   Patient presents with   • Palpitations     f/u     Problem List:  1. Intermittent palpitations with decongestant use, mild caffeine use, residual class I symptoms, with normal EKG:  a. Acceptable negative GXT following exercise to 104% PMHR and 128% predicted exercise capacity, April 2018  b. Echocardiogram demonstrating mild MR and TR without pulmonary arterial hypertension or pericardial effusion or important valvular heart disease with normal LVEF (0.68), April 2018  c. Residual class I symptoms  2. Hyperlipidemia; ASCVD 10-year risk 1.9%, 1.7% if treated  3. IBS  4. Chronic H. pylori infections  5. GERD  6. Former tobacco abuse; 1 pack per week ×3 years, quit in 1998  7. Plantar fasciitis  8. Situational stress with depression related to losing both parents and her brother within a 6-month period in 2018, now with losing her youngest brother (her only remaining sibling) in April 2020  9. Right upper quadrant pain with HIDA scan demonstrating 17% function fall 2018  10. Fibrocystic breast disease, for which she takes vitamin E  11. Surgical history:   a. WILL  b. Cholecystectomy, 12/4/2018  c. Remote benign breast lumpectomy - data deficit     Allergies   Allergen Reactions   • Sulfa Antibiotics Other (See Comments)     Pass out   • Tylenol [Acetaminophen] Other (See Comments)     Pass out       Current Outpatient Medications:   •  buPROPion XL (WELLBUTRIN XL) 300 MG 24 hr tablet, bupropion HCl  mg 24 hr tablet, extended release  Take 1 tablet every day by oral route., Disp: , Rfl:   •  cetirizine (zyrTEC) 10 MG tablet,  "Take 25 mg by mouth As Needed., Disp: , Rfl:   •  DONNATAL 16.2 MG per tablet, Take 1 tablet by mouth As Needed., Disp: , Rfl: 0  •  famotidine (Pepcid) 40 MG tablet, Pepcid 40 mg tablet  1q daily for heartburn, Disp: , Rfl:   •  famotidine (PEPCID) 40 MG tablet, TK 1 T PO QD FOR HEARTBURN, Disp: , Rfl:   •  fluticasone (FLONASE) 50 MCG/ACT nasal spray, As needed, Disp: , Rfl: 5  •  LINZESS 145 MCG capsule capsule, TK 1 C PO D, Disp: , Rfl: 11  •  nebivolol (BYSTOLIC) 2.5 MG tablet, Take 1 tablet by mouth Daily., Disp: 90 tablet, Rfl: 3  •  nitrofurantoin, macrocrystal-monohydrate, (Macrobid) 100 MG capsule, Macrobid 100 mg capsule  1 tab daily as needed, Disp: , Rfl:   •  pantoprazole (PROTONIX) 40 MG EC tablet, Take  by mouth Daily., Disp: , Rfl: 3  •  vitamin E 400 UNIT capsule, Take 400 Units by mouth Daily., Disp: , Rfl:     History of Present Illness: Patient is followed up via telephone for her requested 8-month visit.  She says she lost her youngest brother (56-year-old) to metastatic color cancer on 04/10/2020; she states she is the only living member of her family now.  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  Since we last saw her, she has been \"okay,\" with all that she has been through.  When we last saw her in August 2019, she had lost both parents and another brother in the previous year.  She has not had any recent laboratory studies with her healthcare provider due to the COVID-19 restrictions; she plans to have labs done during the summer of 2020.  She is taking vitamin E for fibrocystic disease.  Patient otherwise denies chest pain, shortness of breath, PND, edema, syncope, or presyncope at this time.  She has experienced increased palpitations during her recent situational stress.      ROS   Obtained and negative except as outlined in problem list and HPI.    Procedures       Objective:       Vitals:    04/22/20 1521   Weight: 62.6 kg (138 " "lb)   Height: 160 cm (63\")     Body mass index is 24.45 kg/m².   Last weight:  134 lbs.    Physical Exam Physical exam not able to be performed due to telephone visit due to coronavirus.      Lab Review: No recent laboratory results available for review today      This patient has consented to a telehealth visit via telephone. The visit was scheduled as a telephone visit to comply with patient safety concerns in accordance with CDC recommendations.  All vitals recorded within this visit are reported by the patient.  I spent 25 minutes in total including but not limited to the 10 minutes spent in direct conversation with this patient.           Assessment:       Overall continued acceptable course with no new interim cardiopulmonary complaints with good functional status. We will defer additional diagnostic or therapeutic intervention from a cardiac perspective at this time.  Hopefully, we will be allowed to review any upcoming laboratory results with her by letter.  Her pharmacy is to call our office if she needs any refills anytime soon.     Diagnosis Plan   1. Palpitations  Recent flareup related to situational stress; Continue current therapy and notify us if symptoms remain persistent over the next few weeks.   2. Dyslipidemia  No data to review; hopefully lab work can be completed in the next few months.   3. Gastroesophageal reflux disease without esophagitis  Largely asymptomatic; Continue current treatment.           Plan:         1. Patient to continue current medications and close follow up with the above providers.  2. Tentative cardiology follow up in October 2020, or patient may return sooner PRN.    Transcribed by Dayana Mckinley for Dr. Tony Dominguez at 3:15 PM on 04/22/2020    ITony MD, [facility], personally performed the services described in this documentation as scribed by the above named individual in my presence, and it is both accurate and complete. At 3:34 PM on 04/22/2020     "

## 2020-06-18 ENCOUNTER — TELEPHONE (OUTPATIENT)
Dept: CARDIOLOGY | Facility: CLINIC | Age: 60
End: 2020-06-18

## 2020-06-18 NOTE — TELEPHONE ENCOUNTER
Noted and concur; symptoms sound atypical for cardiology etiology.  I would love to have the chance to review the chest x-ray report.  Was electrocardiogram done?  I would have her continue the prednisone as planned for 5 days and update us with her symptoms in the next 5-6 days.  If she has prolonged chest pain particularly in the center of her chest with associated nausea, diaphoresis, or dyspnea she needs to come to [facility] ED promptly.    Thanks!

## 2020-06-18 NOTE — TELEPHONE ENCOUNTER
Pt called and stated that in February she started having discomfort in chest that wrapped around to under right arm and back. Pt states that the pain has increased recently.  Pt reports that she still has pressure under right breast, reports indigestion lately. Pt has had CXR and Back Xray with PCP. Xray of back showed bone spurs and arthritis in back, that PCP says could be causing pain.     Pt still complains of pressure under right breast. Pt states that pain doesn't correlate with activity and is tender to the touch.     PCP started patient on prednisone 20 mg for five days, pt started medication yesterday and has noticed some improvement.      Pt denies SOB, nausea, swelling. Pt reports intermittent palpitations, not new. Pt believe these to be related to recently loosing her brother.    Pt currently taking:  Bystolic 10 mg       Patient just wants to make sure this isn't something cardiac related.    Advised pt that I did not believe this pain to be cardiac related, but patient wants Dr. Dominguez's opinion.     Please advise.

## 2020-06-19 NOTE — TELEPHONE ENCOUNTER
Pt called back, gave KTS recommendations above.     Pt states that she did not have an EKG.     Pt verbalizes understanding and agreeable to plan.

## 2020-06-19 NOTE — TELEPHONE ENCOUNTER
Attempted to call pt with KTS recommendations above. LVM at home number, no VM available at mobile number.     Awaiting return call.

## 2020-09-14 RX ORDER — NEBIVOLOL 2.5 MG/1
2.5 TABLET ORAL DAILY
Qty: 90 TABLET | Refills: 3 | Status: SHIPPED | OUTPATIENT
Start: 2020-09-14 | End: 2021-05-26 | Stop reason: SDUPTHER

## 2020-10-23 ENCOUNTER — OFFICE VISIT (OUTPATIENT)
Dept: CARDIOLOGY | Facility: CLINIC | Age: 60
End: 2020-10-23

## 2020-10-23 VITALS
WEIGHT: 136 LBS | DIASTOLIC BLOOD PRESSURE: 60 MMHG | BODY MASS INDEX: 24.1 KG/M2 | SYSTOLIC BLOOD PRESSURE: 116 MMHG | HEART RATE: 79 BPM | HEIGHT: 63 IN

## 2020-10-23 DIAGNOSIS — R00.2 PALPITATIONS: Primary | ICD-10-CM

## 2020-10-23 DIAGNOSIS — E78.5 DYSLIPIDEMIA: ICD-10-CM

## 2020-10-23 DIAGNOSIS — Z87.891 FORMER SMOKER: ICD-10-CM

## 2020-10-23 DIAGNOSIS — Z82.49 FAMILY HISTORY OF EARLY CAD: ICD-10-CM

## 2020-10-23 PROCEDURE — 99214 OFFICE O/P EST MOD 30 MIN: CPT | Performed by: INTERNAL MEDICINE

## 2020-10-23 RX ORDER — SUCRALFATE 1 G/1
1 TABLET ORAL 4 TIMES DAILY
COMMUNITY
End: 2022-07-08

## 2020-10-23 RX ORDER — ERGOCALCIFEROL (VITAMIN D2) 10 MCG
400 TABLET ORAL DAILY
COMMUNITY
End: 2021-05-26

## 2020-10-23 NOTE — PROGRESS NOTES
Subjective:     Encounter Date:10/23/2020    Patient ID: Kandice Manning is a 60 y.o. 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     HEALTHCARE PROVIDER: MARIANA Cartagena  INFECTIOUS DISEASE: Liborio Cao MD  GASTROENTEROLOGIST: Steve Mccain MD  GENERAL SURGEON: Rodríguez Villatoro MD    Chief Complaint:   Chief Complaint   Patient presents with   • Palpitations       Problem List:  1. Intermittent palpitations with decongestant use, mild caffeine use, residual class I symptoms, with normal EKG:  a. Acceptable negative GXT following exercise to 104% PMHR and 128% predicted exercise capacity, 2018  b. Echocardiogram demonstrating mild MR and TR without pulmonary arterial hypertension or pericardial effusion or important valvular heart disease with normal LVEF (0.68), 2018  c. Residual class I symptoms with recent atypical intrascapular pain and increased heart burn with recent apparent acceptable electrocardiogram, 2020  2. Hyperlipidemia; ASCVD 10-year risk 2.6%, 2.3% if treated  3. IBS  4. Chronic H. pylori infections  5. GERD  6. Former tobacco abuse; 1 pack per week ×3 years, quit in   7. Plantar fasciitis  8. Family history of early CAD  9. Situational stress with depression related to losing both parents and her brother within a 6-month period in , now with losing her youngest brother (her only remaining sibling) in 2020, who is now  2020  10. Right upper quadrant pain with HIDA scan demonstrating 17% function 2018  11. Fibrocystic breast disease, for which she takes vitamin E twice weekly  12. Surgical history:   a. WILL  b. Cholecystectomy, 2018  c. Remote benign breast lumpectomy - data deficit    Allergies   Allergen Reactions   • Sulfa Antibiotics Other (See Comments)     Pass out   • Tylenol [Acetaminophen] Other (See Comments)     Pass out       Current Outpatient Medications:   •   cetirizine (zyrTEC) 10 MG tablet, Take 25 mg by mouth As Needed., Disp: , Rfl:   •  DONNATAL 16.2 MG per tablet, Take 1 tablet by mouth As Needed., Disp: , Rfl: 0  •  famotidine (PEPCID) 40 MG tablet, TK 1 T PO QD FOR HEARTBURN, Disp: , Rfl:   •  fluticasone (FLONASE) 50 MCG/ACT nasal spray, As needed, Disp: , Rfl: 5  •  LINZESS 145 MCG capsule capsule, TK 1 C PO D, Disp: , Rfl: 11  •  nebivolol (BYSTOLIC) 2.5 MG tablet, Take 1 tablet by mouth Daily., Disp: 90 tablet, Rfl: 3  •  nitrofurantoin, macrocrystal-monohydrate, (Macrobid) 100 MG capsule, Macrobid 100 mg capsule  1 tab daily as needed, Disp: , Rfl:   •  pantoprazole (PROTONIX) 40 MG EC tablet, Take  by mouth Daily., Disp: , Rfl: 3  •  sucralfate (CARAFATE) 1 g tablet, Take 1 g by mouth 4 (Four) Times a Day., Disp: , Rfl:   •  Vitamin D, Cholecalciferol, (CHOLECALCIFEROL) 10 MCG (400 UNIT) tablet, Take 400 Units by mouth Daily., Disp: , Rfl:   •  vitamin E 400 UNIT capsule, Take 400 Units by mouth Daily., Disp: , Rfl:     History of Present Illness: Patient returns for scheduled 6-month follow up. She had laboratory testing in June 2020.  The patient has had some epigastric chest pain and was recently started on Carafate with improvement in symptoms.  The patient has chronic H. pylori and saw her gastroenterologist.  He felt that her symptoms were related to GERD.  She is trying to not eat greasy foods.  She feels that the Bystolic has helped suppress most of her palpitations.  The patient has had some upper back pain and had x-rays which show that she had bone spurs.  The patient does a lot of heavy lifting at her job.  She has not not tried any heating pads or physical therapy.  The patient has had strong family history of early CAD and she had concerns because she used to be exposed to tobacco and she also used to smoke.  She would like to make sure that she does not have any underlying cardiac disease or cancer.  She lost her last brother in the spring  "2020 from lung cancer.  Occasionally she will have leg cramps and is now only taking vitamin E a couple times a week and is unable to eat bananas.  Before she has tried magnesium and it gave her GI upset but she may try to use this 1 or 2 times per week if she has persistent leg cramps.  She went to the urgent treatment center in [facility] over the weekend for her epigastric pain and had an ECG which apparently was acceptable-data deficit.  She denies any increased shortness of breath, palpitations, presyncope, or syncope.        Review of Systems   Musculoskeletal: Positive for back pain.   Gastrointestinal: Positive for heartburn.      Obtained and negative except as outlined in problem list and HPI.    Procedures       Objective:       Vitals:    10/23/20 1433 10/23/20 1436   BP: 109/58 116/60   BP Location: Right arm Right arm   Patient Position: Sitting Standing   Pulse: 83 79   Weight: 61.7 kg (136 lb)    Height: 160 cm (63\")      Body mass index is 24.09 kg/m².  Last weight: 138 lbs    Vitals signs reviewed.   Constitutional:       Appearance: Well-developed.   Neck:      Thyroid: No thyromegaly.      Vascular: No carotid bruit or JVD.      Lymphadenopathy: No cervical adenopathy.   Pulmonary:      Effort: Pulmonary effort is normal.      Breath sounds: Normal breath sounds. No wheezing. No rhonchi. No rales.   Cardiovascular:      Regular rhythm.      No gallop. No S3 gallop.   Pulses:     Dorsalis pedis: 2+ bilaterally.     Posterior tibial: 2+ bilaterally.  Edema:     Peripheral edema absent.   Abdominal:      Palpations: Abdomen is soft. There is no abdominal mass.      Tenderness: There is no abdominal tenderness. There is no guarding.   Musculoskeletal: Normal range of motion.   Skin:     General: Skin is warm and dry.      Findings: No rash.   Neurological:      Mental Status: Alert and oriented to person, place, and time.           Lab Review:     06/19/2020 (reviewed with patient by " letter):  • CBC: hematocrit 38, hemoglobin 13.1, WBC 9500 and normal indices, platelet count and differential  • CMP: normal electrolytes with acceptable kidney function, serum creatinine 0.7, BUN 23, glucose 108 and normal serum calcium and liver function tests.   • Serum albumin- 4.6  • FLP: total cholesterol 187, triglycerides 56, HDL-C 68, LDL-C 108          Assessment:       Overall continued acceptable course with no new interim cardiopulmonary complaints with acceptable functional status. We will defer additional diagnostic or therapeutic intervention from a cardiac perspective at this time.  We recommended the patient follow-up with her gastroenterologist and may need to be on Dexilant.  In view of the patient's strong family history of early CAD, she will begin rosuvastatin 5 mg daily.  We will screen her with a CT scan in view of her prior tobacco use and exposure to tobacco products.     Diagnosis Plan   1. Palpitations  No persistent palpitations   2. Dyslipidemia  Abnormal lipid panel June 2020, ASCVD 10-year risk 2.6%, 2.3% if treated, rosuvastatin 5 mg daily   3. Family history of early CAD  Acceptable echocardiogram April 2018, CT screening study          Plan:         1. Patient to continue current medications and close follow up with the above providers with the initiation of rosuvastatin 5mg daily.  2. Tentative cardiology follow up in May 2021 or patient may return sooner PRN.  3. CT screening study ordered    Scribed for Tony Dominguez MD by Lizette Quinn, MARIANA. 10/23/2020  15:34 EDT    I, Tony Dominguez MD, [facility], personally performed the services described in this documentation as scribed by the above named individual in my presence, and it is both accurate and complete. At 15:28 EDT on 10/23/2020

## 2020-10-26 RX ORDER — ROSUVASTATIN CALCIUM 5 MG/1
5 TABLET, COATED ORAL NIGHTLY
Qty: 90 TABLET | Refills: 1 | Status: SHIPPED | OUTPATIENT
Start: 2020-10-26 | End: 2021-11-16 | Stop reason: SDUPTHER

## 2020-10-26 NOTE — TELEPHONE ENCOUNTER
Rosuvastatin 5mg daily was to be added at the last appt but the prescription did not go through.  Can you please sign the RX.

## 2020-11-09 ENCOUNTER — HOSPITAL ENCOUNTER (OUTPATIENT)
Dept: CT IMAGING | Facility: HOSPITAL | Age: 60
Discharge: HOME | End: 2020-11-09
Admitting: INTERNAL MEDICINE

## 2020-11-09 DIAGNOSIS — Z87.891 FORMER SMOKER: ICD-10-CM

## 2020-11-09 DIAGNOSIS — Z82.49 FAMILY HISTORY OF EARLY CAD: ICD-10-CM

## 2020-11-09 PROCEDURE — G0297 LDCT FOR LUNG CA SCREEN: HCPCS

## 2020-11-23 ENCOUNTER — TELEPHONE (OUTPATIENT)
Dept: CARDIOLOGY | Facility: CLINIC | Age: 60
End: 2020-11-23

## 2020-11-23 NOTE — TELEPHONE ENCOUNTER
Patient call, LVM regarding KTS CT results letter. Called pt back, no answer. LVM at home number, no answer at cell phone, no voicemail available.

## 2021-03-03 ENCOUNTER — TELEPHONE (OUTPATIENT)
Dept: CARDIOLOGY | Facility: CLINIC | Age: 61
End: 2021-03-03

## 2021-03-03 DIAGNOSIS — Z80.1 FAMILY HISTORY OF LUNG CANCER: Primary | ICD-10-CM

## 2021-03-03 DIAGNOSIS — Z87.891 FORMER SMOKER: ICD-10-CM

## 2021-03-03 NOTE — TELEPHONE ENCOUNTER
Patient called, LVM and stated that she would like to see pulmonologist and needs referral to [facility]. Patient states that after loosing her brother to lung cancer, she is very nervous and would like to establish care with pulmonology.     Tried to call pt back for more information. No answer, LVM for return call. s    Ok to place referral?    Please advise.

## 2021-04-09 DIAGNOSIS — Z01.812 BLOOD TESTS PRIOR TO TREATMENT OR PROCEDURE: Primary | ICD-10-CM

## 2021-04-12 ENCOUNTER — CLINICAL SUPPORT NO REQUIREMENTS (OUTPATIENT)
Dept: PULMONOLOGY | Facility: CLINIC | Age: 61
End: 2021-04-12

## 2021-04-12 DIAGNOSIS — Z01.812 BLOOD TESTS PRIOR TO TREATMENT OR PROCEDURE: ICD-10-CM

## 2021-04-12 PROCEDURE — U0004 COV-19 TEST NON-CDC HGH THRU: HCPCS | Performed by: INTERNAL MEDICINE

## 2021-04-12 PROCEDURE — 99211 OFF/OP EST MAY X REQ PHY/QHP: CPT | Performed by: INTERNAL MEDICINE

## 2021-04-13 LAB — SARS-COV-2 RNA NOSE QL NAA+PROBE: NOT DETECTED

## 2021-04-14 ENCOUNTER — OFFICE VISIT (OUTPATIENT)
Dept: PULMONOLOGY | Facility: CLINIC | Age: 61
End: 2021-04-14

## 2021-04-14 VITALS
OXYGEN SATURATION: 98 % | HEART RATE: 74 BPM | TEMPERATURE: 97.7 F | HEIGHT: 65 IN | WEIGHT: 145 LBS | DIASTOLIC BLOOD PRESSURE: 72 MMHG | SYSTOLIC BLOOD PRESSURE: 118 MMHG | BODY MASS INDEX: 24.16 KG/M2

## 2021-04-14 DIAGNOSIS — R07.89 CHEST PAIN, ATYPICAL: Primary | ICD-10-CM

## 2021-04-14 DIAGNOSIS — J30.89 SEASONAL AND PERENNIAL ALLERGIC RHINITIS: ICD-10-CM

## 2021-04-14 DIAGNOSIS — J30.2 SEASONAL AND PERENNIAL ALLERGIC RHINITIS: ICD-10-CM

## 2021-04-14 DIAGNOSIS — K21.9 CHRONIC GERD: ICD-10-CM

## 2021-04-14 DIAGNOSIS — Z87.891 FORMER SMOKER: ICD-10-CM

## 2021-04-14 DIAGNOSIS — R06.02 SOB (SHORTNESS OF BREATH): ICD-10-CM

## 2021-04-14 PROCEDURE — 94726 PLETHYSMOGRAPHY LUNG VOLUMES: CPT | Performed by: INTERNAL MEDICINE

## 2021-04-14 PROCEDURE — 94729 DIFFUSING CAPACITY: CPT | Performed by: INTERNAL MEDICINE

## 2021-04-14 PROCEDURE — 99204 OFFICE O/P NEW MOD 45 MIN: CPT | Performed by: INTERNAL MEDICINE

## 2021-04-14 PROCEDURE — 94375 RESPIRATORY FLOW VOLUME LOOP: CPT | Performed by: INTERNAL MEDICINE

## 2021-04-14 RX ORDER — ALBUTEROL SULFATE 90 UG/1
AEROSOL, METERED RESPIRATORY (INHALATION) SEE ADMIN INSTRUCTIONS
COMMUNITY
Start: 2021-02-19 | End: 2021-12-08

## 2021-04-14 RX ORDER — ALBUTEROL SULFATE 90 UG/1
2 AEROSOL, METERED RESPIRATORY (INHALATION) EVERY 4 HOURS PRN
Qty: 54 G | Refills: 3 | Status: SHIPPED | OUTPATIENT
Start: 2021-04-14

## 2021-04-14 NOTE — PROGRESS NOTES
"  PULMONARY  NOTE    Chief Complaint     Chest discomfort, reflux, former smoker, perennial and seasonal sinus symptoms    History of Present Illness     61-year-old female referred for evaluation of chest symptoms    She has a history of tobacco abuse, resolved about 25 years ago    She has had chest discomfort or chest pain.  Her \"lungs hurt\" at times.  This will occur with coughing or deep breathing at times other times with exertion  She had a cardiac evaluation that was unremarkable    She gets short of breath with activity at times but can go up about 3 flight of stairs without having to rest due to shortness of breath.  She has albuterol that she primarily uses if she has coughing episodes    She has regular reflux symptoms.  She tries to follow reflux precautions and takes Protonix on a daily basis.  Despite that she will have regular reflux symptoms    She has chronic sinus drainage that is aggravated on a seasonal basis.  She has frequent sinus infections.    Patient Active Problem List   Diagnosis   • Palpitations   • Mixed hyperlipidemia   • Family history of early CAD   • Dyslipidemia   • Chest pain, atypical for angina   • Perennial allergic rhinitis   • Chronic GERD   • Former smoker (None x 25 years)     Allergies   Allergen Reactions   • Sulfa Antibiotics Other (See Comments)     Pass out   • Tylenol [Acetaminophen] Other (See Comments)     Pass out       Current Outpatient Medications:   •  albuterol sulfate  (90 Base) MCG/ACT inhaler, Inhale See Admin Instructions. Inhale 2 puffs by mouth every 4 to 6 hours as needed, Disp: , Rfl:   •  cetirizine (zyrTEC) 10 MG tablet, Take 25 mg by mouth As Needed., Disp: , Rfl:   •  DONNATAL 16.2 MG per tablet, Take 1 tablet by mouth As Needed., Disp: , Rfl: 0  •  famotidine (PEPCID) 40 MG tablet, TK 1 T PO QD FOR HEARTBURN, Disp: , Rfl:   •  fluticasone (FLONASE) 50 MCG/ACT nasal spray, As needed, Disp: , Rfl: 5  •  LINZESS 145 MCG capsule capsule, TK 1 " "C PO D, Disp: , Rfl: 11  •  nebivolol (BYSTOLIC) 2.5 MG tablet, Take 1 tablet by mouth Daily., Disp: 90 tablet, Rfl: 3  •  nitrofurantoin, macrocrystal-monohydrate, (Macrobid) 100 MG capsule, Macrobid 100 mg capsule  1 tab daily as needed, Disp: , Rfl:   •  pantoprazole (PROTONIX) 40 MG EC tablet, Take  by mouth Daily., Disp: , Rfl: 3  •  rosuvastatin (CRESTOR) 5 MG tablet, Take 1 tablet by mouth Every Night., Disp: 90 tablet, Rfl: 1  •  sucralfate (CARAFATE) 1 g tablet, Take 1 g by mouth 4 (Four) Times a Day., Disp: , Rfl:   •  vitamin E 400 UNIT capsule, Take 400 Units by mouth Daily., Disp: , Rfl:   •  Vitamin D, Cholecalciferol, (CHOLECALCIFEROL) 10 MCG (400 UNIT) tablet, Take 400 Units by mouth Daily., Disp: , Rfl:   MEDICATION LIST AND ALLERGIES REVIEWED.    Family History   Problem Relation Age of Onset   • Heart disease Mother    • Heart disease Father    • Heart attack Brother    • Heart disease Other    • Heart attack Other      Social History     Tobacco Use   • Smoking status: Former Smoker     Packs/day: 0.25     Types: Cigarettes     Quit date: 3/28/1998     Years since quittin.0   • Smokeless tobacco: Never Used   Substance Use Topics   • Alcohol use: No   • Drug use: No     Social History     Social History Narrative        Continues to work    Smoked less than 1 pack of cigarettes per day and stop smoking over 25 years ago    Currently does not drink alcohol     FAMILY AND SOCIAL HISTORY REVIEWED.    Review of Systems  ALSO REFER TO SCANNED ROS SHEET FROM SAME DATE.    /72 (BP Location: Left arm, Patient Position: Sitting, Cuff Size: Adult)   Pulse 74   Temp 97.7 °F (36.5 °C)   Ht 163.8 cm (64.5\")   Wt 65.8 kg (145 lb)   SpO2 98% Comment: room  air at rest  BMI 24.50 kg/m²   Physical Exam  Vitals and nursing note reviewed.   Constitutional:       General: She is not in acute distress.     Appearance: She is well-developed. She is not diaphoretic.   HENT:      Head: " Normocephalic and atraumatic.   Neck:      Thyroid: No thyromegaly.   Cardiovascular:      Rate and Rhythm: Normal rate and regular rhythm.      Heart sounds: Normal heart sounds. No murmur heard.     Pulmonary:      Effort: Pulmonary effort is normal.      Breath sounds: Normal breath sounds. No stridor.   Abdominal:      General: Bowel sounds are normal.      Palpations: Abdomen is soft.   Musculoskeletal:         General: Normal range of motion.      Right lower leg: No edema.      Left lower leg: No edema.   Lymphadenopathy:      Cervical: No cervical adenopathy.      Upper Body:      Right upper body: No supraclavicular or epitrochlear adenopathy.      Left upper body: No supraclavicular or epitrochlear adenopathy.   Skin:     General: Skin is warm and dry.   Neurological:      Mental Status: She is alert and oriented to person, place, and time.   Psychiatric:         Behavior: Behavior normal.         Results     Low-dose CT scan of the chest on 11/9/2020 reviewed on PACS.  No suspicious intrathoracic lesions    PFTs reveal no airway obstruction, no restriction, and a normal diffusion capacity  Immunization History   Administered Date(s) Administered   • Flu Vaccine Split Quad 11/01/2018, 10/01/2019     Problem List       ICD-10-CM ICD-9-CM   1. Chest pain, atypical for angina  R07.89 786.59   2. Chronic GERD  K21.9 530.81   3. Former smoker (None x 25 years)  Z87.891 V15.82   4. Perennial allergic rhinitis  J30.89 477.9    J30.2        Discussion     We reviewed her test results  I reassured her that she does not have evidence of COPD.  She could still have asthma although she has relatively minimal respiratory symptoms.  I have encouraged her to continue to use albuterol on an as-needed basis.    She is quite concerned about developing lung cancer with her family history  I reassured her that her LDCT done in November 2021 did not show any suspicious lesions  She does not really fit criteria for LDCT  screening but we will get a annual CT scan of the chest, next in November 2021    I suspect that her chest pain is related to her gastric and esophageal disease  I have recommended a ventilation/perfusion scan to help rule out chronic PTE.  My index of suspicion is low, however    We discussed reflux precautions    She is going to continue to use OTC sinus medications for her seasonal him perennial sinus drainage and postnasal drip    I will plan to see her back after the above    Moderate level of Medical Decision Making complexity based on 1 undiagnosed new problem and moderate amount and/or complexity of data review/analysis    Liborio Child MD  Note electronically signed    CC: Kelly Ornelas, APRN

## 2021-04-16 DIAGNOSIS — R06.02 SHORTNESS OF BREATH: ICD-10-CM

## 2021-04-16 DIAGNOSIS — R07.89 CHEST PAIN, ATYPICAL: Primary | ICD-10-CM

## 2021-05-26 ENCOUNTER — OFFICE VISIT (OUTPATIENT)
Dept: CARDIOLOGY | Facility: CLINIC | Age: 61
End: 2021-05-26

## 2021-05-26 VITALS
OXYGEN SATURATION: 99 % | SYSTOLIC BLOOD PRESSURE: 136 MMHG | DIASTOLIC BLOOD PRESSURE: 70 MMHG | BODY MASS INDEX: 25.69 KG/M2 | WEIGHT: 145 LBS | HEIGHT: 63 IN | HEART RATE: 71 BPM

## 2021-05-26 DIAGNOSIS — Z80.1 FAMILY HISTORY OF LUNG CANCER: ICD-10-CM

## 2021-05-26 DIAGNOSIS — R00.2 PALPITATIONS: Primary | ICD-10-CM

## 2021-05-26 DIAGNOSIS — E78.5 DYSLIPIDEMIA: ICD-10-CM

## 2021-05-26 PROCEDURE — 99214 OFFICE O/P EST MOD 30 MIN: CPT | Performed by: INTERNAL MEDICINE

## 2021-05-26 RX ORDER — MONTELUKAST SODIUM 10 MG/1
10 TABLET ORAL DAILY
COMMUNITY
Start: 2021-05-13

## 2021-05-26 RX ORDER — NEBIVOLOL 2.5 MG/1
2.5 TABLET ORAL DAILY
Qty: 90 TABLET | Refills: 3 | Status: SHIPPED | OUTPATIENT
Start: 2021-05-26 | End: 2021-10-04 | Stop reason: SDUPTHER

## 2021-05-26 NOTE — PROGRESS NOTES
Subjective:     Encounter Date:2021    Patient ID: Kandice Manning is a 61 y.o. 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     HEALTHCARE PROVIDER: MARIANA Cartagena  INFECTIOUS DISEASE: Liborio Cao MD  GASTROENTEROLOGIST: Steve Mccain MD  GENERAL SURGEON: Rodríguez Villatoro MD  PULMONOLOGIST: EMILY Child MD, [facility]    Chief Complaint:   Chief Complaint   Patient presents with   • Palpitations     f.u       Problem List:  1. Intermittent palpitations with decongestant use, mild caffeine use, residual class I symptoms, with normal EKG:  a. Acceptable negative GXT following exercise to 104% PMHR and 128% predicted exercise capacity, 2018  b. Echocardiogram demonstrating mild MR and TR without pulmonary arterial hypertension or pericardial effusion or important valvular heart disease with normal LVEF (0.68), 2018  c. Residual class I symptoms with recent atypical intrascapular pain and increased heart burn with recent apparent acceptable electrocardiogram, 2020  2. Hyperlipidemia; ASCVD 10-year risk 2.6%, 2.3% if treated  3. IBS  4. Chronic H. pylori infections  5. GERD  6. Former tobacco abuse; 1 pack per week ×3 years, quit in   7. Plantar fasciitis  8. Family history of early CAD  9. Situational stress with depression related to losing both parents and her brother within a 6-month period in , now with losing her youngest brother (her only remaining sibling) in 2020, who is now  2020  10. Right upper quadrant pain with HIDA scan demonstrating 17% function 2018  11. Fibrocystic breast disease, for which she takes vitamin E twice weekly  12. Surgical history:   a. WILL  b. Cholecystectomy, 2018  c. Remote benign breast lumpectomy - data deficit    Allergies   Allergen Reactions   • Sulfa Antibiotics Other (See Comments)     Pass out   • Tylenol [Acetaminophen] Other (See Comments)      Pass out       Current Outpatient Medications:   •  albuterol sulfate  (90 Base) MCG/ACT inhaler, Inhale See Admin Instructions. Inhale 2 puffs by mouth every 4 to 6 hours as needed, Disp: , Rfl:   •  albuterol sulfate  (90 Base) MCG/ACT inhaler, Inhale 2 puffs Every 4 (Four) Hours As Needed for Wheezing., Disp: 54 g, Rfl: 3  •  cetirizine (zyrTEC) 10 MG tablet, Take 25 mg by mouth As Needed., Disp: , Rfl:   •  DONNATAL 16.2 MG per tablet, Take 1 tablet by mouth As Needed., Disp: , Rfl: 0  •  famotidine (PEPCID) 40 MG tablet, TK 1 T PO QD FOR HEARTBURN, Disp: , Rfl:   •  fluticasone (FLONASE) 50 MCG/ACT nasal spray, As needed, Disp: , Rfl: 5  •  LINZESS 145 MCG capsule capsule, TK 1 C PO D, Disp: , Rfl: 11  •  montelukast (SINGULAIR) 10 MG tablet, Take 10 mg by mouth Daily., Disp: , Rfl:   •  nebivolol (BYSTOLIC) 2.5 MG tablet, Take 1 tablet by mouth Daily., Disp: 90 tablet, Rfl: 3  •  nitrofurantoin, macrocrystal-monohydrate, (Macrobid) 100 MG capsule, Macrobid 100 mg capsule  1 tab daily as needed, Disp: , Rfl:   •  pantoprazole (PROTONIX) 40 MG EC tablet, Take  by mouth Daily., Disp: , Rfl: 3  •  rosuvastatin (CRESTOR) 5 MG tablet, Take 1 tablet by mouth Every Night., Disp: 90 tablet, Rfl: 1  •  sucralfate (CARAFATE) 1 g tablet, Take 1 g by mouth 4 (Four) Times a Day., Disp: , Rfl:   •  vitamin E 400 UNIT capsule, Take 400 Units by mouth Daily., Disp: , Rfl:     History of Present Illness: Patient returns for scheduled 7-month follow up. She has been feeling well overall from a cardiovascular standpoint. Patient denies chest pain, shortness of breath, palpitations, edema, dizziness, and syncope. She has had no interim ER visits, hospitalizations, serious illnesses, or surgeries. She received initial COVID immunization. She will have lung testing performed here at [facility] tomorrow and she will receive her second COVID immunization dose tomorrow as well.      ROS   Obtained and negative except as outlined  "in problem list and HPI.    Procedures       Objective:       Vitals:    05/26/21 1516 05/26/21 1519   BP: 124/70 136/70   BP Location: Right arm Right arm   Patient Position: Sitting Standing   Pulse: 73 71   SpO2: 99%    Weight: 65.8 kg (145 lb)    Height: 160 cm (63\")      Body mass index is 25.69 kg/m².  Last weight: 136 lbs    Vitals reviewed.   Constitutional:       Appearance: Well-developed.   Neck:      Thyroid: No thyromegaly.      Vascular: No carotid bruit or JVD.      Lymphadenopathy: No cervical adenopathy.   Pulmonary:      Effort: Pulmonary effort is normal.      Breath sounds: Decreased breath sounds present. No wheezing. No rhonchi. No rales.   Cardiovascular:      Regular rhythm.      Murmurs: There is a grade 1/6 mid frequency early systolic murmur at the LLSB.      No gallop. No S3 gallop.   Pulses:     Dorsalis pedis: 2+ bilaterally.     Posterior tibial: 2+ bilaterally.  Edema:     Peripheral edema absent.   Abdominal:      Palpations: Abdomen is soft. There is no abdominal mass.      Tenderness: There is no abdominal tenderness. There is no guarding.   Musculoskeletal: Normal range of motion. Skin:     General: Skin is warm and dry.      Findings: No rash.   Neurological:      Mental Status: Alert and oriented to person, place, and time.           Lab Review:     No recent laboratory studies available for review today.    CT Chest, 11/9/2020 (reviewed with patient by letter - refrain from all tobacco use, including secondary smoke):  Normal thoracic aorta except atherosclerotic changes with mild centrilobular emphysema without evidence of infection, inflammation, or congestive heart failure. No suspicious pulmonary nodules are noted.     Pulmonary Function Test, 4/14/2021:  No airway obstruction. No restriction. Normal diffusion.      Assessment:       Overall continued acceptable course with no new interim cardiopulmonary complaints with good functional status. We will defer additional " diagnostic or therapeutic intervention from a cardiac perspective at this time.     Diagnosis Plan   1. Palpitations  Stable and asymptomatic. Continue current treatment.   2. Dyslipidemia  No new data to review. Continue rosuvastatin.   3. Family history of lung cancer  Lung tests will be obtained here at [facility] tomorrow.          Plan:         1. Patient to continue current medications and close follow up with the above providers.  2. Tentative cardiology follow up in November 2021 or patient may return sooner PRN.    I, Brandyn Wyatt, attest that this documentation has been prepared under the direction and in the presence of Tony Dominguez MD 05/26/2021    I, Tony Dominguez MD, [facility], personally performed the services described in this documentation as scribed by the above named individual in my presence, and it is both accurate and complete. At 16:01 EDT on 05/26/2021

## 2021-05-27 ENCOUNTER — HOSPITAL ENCOUNTER (OUTPATIENT)
Dept: GENERAL RADIOLOGY | Facility: HOSPITAL | Age: 61
Discharge: HOME | End: 2021-05-27

## 2021-05-27 ENCOUNTER — HOSPITAL ENCOUNTER (OUTPATIENT)
Dept: NUCLEAR MEDICINE | Facility: HOSPITAL | Age: 61
Discharge: HOME | End: 2021-05-27

## 2021-05-27 ENCOUNTER — HOSPITAL ENCOUNTER (OUTPATIENT)
Dept: CT IMAGING | Facility: HOSPITAL | Age: 61
Discharge: HOME | End: 2021-05-27

## 2021-05-27 DIAGNOSIS — R06.02 SHORTNESS OF BREATH: ICD-10-CM

## 2021-05-27 DIAGNOSIS — R07.89 CHEST PAIN, ATYPICAL: ICD-10-CM

## 2021-05-27 DIAGNOSIS — R07.9 CHEST PAIN: ICD-10-CM

## 2021-05-27 PROCEDURE — 0 TECHNETIUM ALBUMIN AGGREGATED: Performed by: NURSE PRACTITIONER

## 2021-05-27 PROCEDURE — A9540 TC99M MAA: HCPCS | Performed by: NURSE PRACTITIONER

## 2021-05-27 PROCEDURE — 71045 X-RAY EXAM CHEST 1 VIEW: CPT

## 2021-05-27 PROCEDURE — 71250 CT THORAX DX C-: CPT

## 2021-05-27 PROCEDURE — 78580 LUNG PERFUSION IMAGING: CPT

## 2021-05-27 RX ADMIN — KIT FOR THE PREPARATION OF TECHNETIUM TC 99M ALBUMIN AGGREGATED 1 DOSE: 2.5 INJECTION, POWDER, FOR SOLUTION INTRAVENOUS at 11:35

## 2021-06-07 ENCOUNTER — TELEPHONE (OUTPATIENT)
Dept: PULMONOLOGY | Facility: CLINIC | Age: 61
End: 2021-06-07

## 2021-06-07 NOTE — TELEPHONE ENCOUNTER
Spoke with pt and informed her of recent Lung Scan and Chest CT that both is showing normal/no evidence of infection and to f/u with Dr Liborio Child for detailed information. Pt verbalized understanding and appreciation.

## 2021-06-24 ENCOUNTER — OFFICE VISIT (OUTPATIENT)
Dept: PULMONOLOGY | Facility: CLINIC | Age: 61
End: 2021-06-24

## 2021-06-24 VITALS
TEMPERATURE: 97.8 F | BODY MASS INDEX: 25.27 KG/M2 | DIASTOLIC BLOOD PRESSURE: 70 MMHG | HEIGHT: 63 IN | WEIGHT: 142.6 LBS | HEART RATE: 78 BPM | SYSTOLIC BLOOD PRESSURE: 118 MMHG | OXYGEN SATURATION: 98 %

## 2021-06-24 DIAGNOSIS — Z87.891 FORMER SMOKER: ICD-10-CM

## 2021-06-24 DIAGNOSIS — R07.89 CHEST PAIN, ATYPICAL: Primary | ICD-10-CM

## 2021-06-24 DIAGNOSIS — J30.89 SEASONAL AND PERENNIAL ALLERGIC RHINITIS: ICD-10-CM

## 2021-06-24 DIAGNOSIS — K21.9 CHRONIC GERD: ICD-10-CM

## 2021-06-24 DIAGNOSIS — J30.2 SEASONAL AND PERENNIAL ALLERGIC RHINITIS: ICD-10-CM

## 2021-06-24 PROCEDURE — 99214 OFFICE O/P EST MOD 30 MIN: CPT | Performed by: INTERNAL MEDICINE

## 2021-06-24 NOTE — PROGRESS NOTES
PULMONARY  NOTE    Chief Complaint     History of chest discomfort, reflux, former smoker, perennial and seasonal sinus symptoms    History of Present Illness     61-year-old female returns today for follow-up.  I last saw her on 4/14/2021    She has a history of tobacco abuse, resolved about 25 years ago    She has intermittent chest discomfort.  Cardiac evaluation to date has been unremarkable    She underwent a ventilation/perfusion scan since I last saw her which revealed no evidence of chronic PTE    Previous PFTs and exam are within normal limits.    She also underwent a CT scan of the chest on 5/27/2021 which revealed no suspicious intrathoracic lesions.    She does have reflux on a regular basis despite taking Protonix.    She has chronic sinus drainage worse on a seasonal basis.  Singulair seems to help.  In the past she has had frequent sinus infections    Patient Active Problem List   Diagnosis   • Palpitations   • Mixed hyperlipidemia   • Family history of early CAD   • Dyslipidemia   • Chest pain, atypical for angina   • Perennial allergic rhinitis   • Chronic GERD   • Former smoker (None x 25 years)     Allergies   Allergen Reactions   • Sulfa Antibiotics Other (See Comments)     Pass out   • Tylenol [Acetaminophen] Other (See Comments)     Pass out       Current Outpatient Medications:   •  albuterol sulfate  (90 Base) MCG/ACT inhaler, Inhale See Admin Instructions. Inhale 2 puffs by mouth every 4 to 6 hours as needed, Disp: , Rfl:   •  albuterol sulfate  (90 Base) MCG/ACT inhaler, Inhale 2 puffs Every 4 (Four) Hours As Needed for Wheezing., Disp: 54 g, Rfl: 3  •  cetirizine (zyrTEC) 10 MG tablet, Take 25 mg by mouth As Needed., Disp: , Rfl:   •  DONNATAL 16.2 MG per tablet, Take 1 tablet by mouth As Needed., Disp: , Rfl: 0  •  famotidine (PEPCID) 40 MG tablet, TK 1 T PO QD FOR HEARTBURN, Disp: , Rfl:   •  fluticasone (FLONASE) 50 MCG/ACT nasal spray, As needed, Disp: , Rfl: 5  •   "LINZESS 145 MCG capsule capsule, TK 1 C PO D, Disp: , Rfl: 11  •  montelukast (SINGULAIR) 10 MG tablet, Take 10 mg by mouth Daily., Disp: , Rfl:   •  nebivolol (BYSTOLIC) 2.5 MG tablet, Take 1 tablet by mouth Daily., Disp: 90 tablet, Rfl: 3  •  nitrofurantoin, macrocrystal-monohydrate, (Macrobid) 100 MG capsule, Macrobid 100 mg capsule  1 tab daily as needed, Disp: , Rfl:   •  pantoprazole (PROTONIX) 40 MG EC tablet, Take  by mouth Daily., Disp: , Rfl: 3  •  rosuvastatin (CRESTOR) 5 MG tablet, Take 1 tablet by mouth Every Night., Disp: 90 tablet, Rfl: 1  •  sucralfate (CARAFATE) 1 g tablet, Take 1 g by mouth 4 (Four) Times a Day., Disp: , Rfl:   •  vitamin E 400 UNIT capsule, Take 400 Units by mouth Daily., Disp: , Rfl:   MEDICATION LIST AND ALLERGIES REVIEWED.    Family History   Problem Relation Age of Onset   • Heart disease Mother    • Heart disease Father    • Heart attack Brother    • Heart disease Other    • Heart attack Other      Social History     Tobacco Use   • Smoking status: Former Smoker     Packs/day: 0.25     Types: Cigarettes     Quit date: 3/28/1998     Years since quittin.2   • Smokeless tobacco: Never Used   Substance Use Topics   • Alcohol use: No   • Drug use: No     Social History     Social History Narrative        Continues to work    Smoked less than 1 pack of cigarettes per day and stop smoking over 25 years ago    Currently does not drink alcohol     FAMILY AND SOCIAL HISTORY REVIEWED.    Review of Systems  ALSO REFER TO SCANNED ROS SHEET FROM SAME DATE.    /70   Pulse 78   Temp 97.8 °F (36.6 °C)   Ht 160 cm (63\")   Wt 64.7 kg (142 lb 9.6 oz)   SpO2 98% Comment: resting at room air  BMI 25.26 kg/m²   Physical Exam  Vitals and nursing note reviewed.   Constitutional:       General: She is not in acute distress.     Appearance: She is well-developed. She is not diaphoretic.   HENT:      Head: Normocephalic and atraumatic.   Neck:      Thyroid: No thyromegaly. "   Cardiovascular:      Rate and Rhythm: Normal rate and regular rhythm.      Heart sounds: Normal heart sounds. No murmur heard.     Pulmonary:      Effort: Pulmonary effort is normal.      Breath sounds: Normal breath sounds. No stridor.   Abdominal:      General: Bowel sounds are normal.      Palpations: Abdomen is soft.   Musculoskeletal:         General: Normal range of motion.   Lymphadenopathy:      Cervical: No cervical adenopathy.      Upper Body:      Right upper body: No supraclavicular or epitrochlear adenopathy.      Left upper body: No supraclavicular or epitrochlear adenopathy.   Skin:     General: Skin is warm and dry.   Neurological:      Mental Status: She is alert and oriented to person, place, and time.   Psychiatric:         Behavior: Behavior normal.         Results     Chest x-ray on 5/27/2021 revealed no active disease    Ventilation/perfusion scan on 5/27/2021 reviewed on PACS.  No perfusion defects    CT scan of the chest on 5/27/2021 revealed no suspicious intrathoracic lesions  Immunization History   Administered Date(s) Administered   • COVID-19 (MODERNA) 04/21/2021, 05/27/2021   • COVID-19 (PFIZER) 04/17/2021   • Flu Vaccine Split Quad 11/01/2018, 10/01/2019     Problem List       ICD-10-CM ICD-9-CM   1. Chest pain, atypical for angina  R07.89 786.59   2. Chronic GERD  K21.9 530.81   3. Former smoker (None x 25 years)  Z87.891 V15.82   4. Perennial allergic rhinitis  J30.89 477.9    J30.2        Discussion     We reviewed her test results.  She has no evidence of chronic lung disease such as COPD.  No suspicious lesions on CT scan.  She does not meet criteria for LD CT screening.  However, given her family history and her high level of anxiety related to lung cancer, we will plan on getting a CT scan of the chest in 1 year.    I have encouraged her to remain on Singulair with albuterol on an as-needed basis for her sinus symptoms.    I have encouraged continued compliance with reflux  precautions    Moderate level of Medical Decision Making complexity based on 2 or more chronic stable illnesses and prescription drug management.    Liborio Child MD  Note electronically signed    CC: Kelly Ornelas, APRN

## 2021-06-25 DIAGNOSIS — R07.89 CHEST PAIN, ATYPICAL: Primary | ICD-10-CM

## 2021-10-04 RX ORDER — NEBIVOLOL 2.5 MG/1
2.5 TABLET ORAL DAILY
Qty: 90 TABLET | Refills: 3 | Status: SHIPPED | OUTPATIENT
Start: 2021-10-04 | End: 2021-12-08 | Stop reason: SDUPTHER

## 2021-11-16 RX ORDER — ROSUVASTATIN CALCIUM 5 MG/1
5 TABLET, COATED ORAL NIGHTLY
Qty: 90 TABLET | Refills: 0 | Status: SHIPPED | OUTPATIENT
Start: 2021-11-16 | End: 2021-12-08 | Stop reason: SDUPTHER

## 2021-12-08 ENCOUNTER — OFFICE VISIT (OUTPATIENT)
Dept: CARDIOLOGY | Facility: CLINIC | Age: 61
End: 2021-12-08

## 2021-12-08 VITALS
HEIGHT: 63 IN | WEIGHT: 144 LBS | HEART RATE: 72 BPM | SYSTOLIC BLOOD PRESSURE: 130 MMHG | BODY MASS INDEX: 25.52 KG/M2 | DIASTOLIC BLOOD PRESSURE: 74 MMHG | OXYGEN SATURATION: 97 %

## 2021-12-08 DIAGNOSIS — Z80.1 FAMILY HISTORY OF LUNG CANCER: ICD-10-CM

## 2021-12-08 DIAGNOSIS — E78.5 DYSLIPIDEMIA: ICD-10-CM

## 2021-12-08 DIAGNOSIS — R00.2 PALPITATIONS: Primary | ICD-10-CM

## 2021-12-08 PROCEDURE — 99214 OFFICE O/P EST MOD 30 MIN: CPT | Performed by: INTERNAL MEDICINE

## 2021-12-08 RX ORDER — ROSUVASTATIN CALCIUM 5 MG/1
5 TABLET, COATED ORAL NIGHTLY
Qty: 90 TABLET | Refills: 3 | Status: SHIPPED | OUTPATIENT
Start: 2021-12-08 | End: 2022-12-28 | Stop reason: SDUPTHER

## 2021-12-08 RX ORDER — BUPROPION HYDROCHLORIDE 150 MG/1
150 TABLET ORAL DAILY
COMMUNITY
Start: 2021-10-01 | End: 2022-07-08

## 2021-12-08 RX ORDER — NEBIVOLOL 2.5 MG/1
2.5 TABLET ORAL DAILY
Qty: 90 TABLET | Refills: 3 | Status: SHIPPED | OUTPATIENT
Start: 2021-12-08 | End: 2022-07-21 | Stop reason: SDUPTHER

## 2021-12-08 RX ORDER — TRIAMCINOLONE ACETONIDE 1 MG/G
CREAM TOPICAL 2 TIMES DAILY PRN
COMMUNITY
Start: 2021-11-27

## 2021-12-08 NOTE — PROGRESS NOTES
Subjective:     Encounter Date:2021    Patient ID: Kandice Manning is a 61 y.o. 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     HEALTHCARE PROVIDER: MARIANA Cartagena  INFECTIOUS DISEASE: Liborio Cao MD  GASTROENTEROLOGIST: Steve Mccain MD  GENERAL SURGEON: Rodríguez Villatoro MD  PULMONOLOGIST: EMILY Child MD, [facility]    Chief Complaint:   Chief Complaint   Patient presents with   • Palpitations       Problem List:  1. Intermittent palpitations with decongestant use, mild caffeine use, residual class I symptoms, with normal EKG:  a. Acceptable negative GXT following exercise to 104% PMHR and 128% predicted exercise capacity, 2018  b. Echocardiogram demonstrating mild MR and TR without pulmonary arterial hypertension or pericardial effusion or important valvular heart disease with normal LVEF (0.68), 2018  c. Residual class I symptoms with recent atypical intrascapular pain and increased heart burn with recent apparent acceptable electrocardiogram, 2020  d. Residual class I symptoms, 2021.  2. Hyperlipidemia; ASCVD 10-year risk 2.6%, 2.3% if treated  3. IBS  4. Chronic H. pylori infections  5. GERD  6. Former tobacco abuse; 1 pack per week ×3 years, quit in   7. Plantar fasciitis  8. Family history of early CAD  9. Situational stress with depression related to losing both parents and her brother within a 6-month period in , now with losing her youngest brother (her only remaining sibling) in 2020, who is now  2020  10. Right upper quadrant pain with HIDA scan demonstrating 17% function 2018  11. Fibrocystic breast disease, for which she takes vitamin E twice weekly  12. Surgical history:   a. WILL  b. Cholecystectomy, 2018  c. Remote benign breast lumpectomy - data deficit    Allergies   Allergen Reactions   • Sulfa Antibiotics Other (See Comments)     Pass out   • Tylenol  [Acetaminophen] Other (See Comments)     Pass out       Current Outpatient Medications:   •  albuterol sulfate  (90 Base) MCG/ACT inhaler, Inhale 2 puffs Every 4 (Four) Hours As Needed for Wheezing., Disp: 54 g, Rfl: 3  •  buPROPion XL (WELLBUTRIN XL) 150 MG 24 hr tablet, Take 150 mg by mouth Daily., Disp: , Rfl:   •  cetirizine (zyrTEC) 10 MG tablet, Take 25 mg by mouth As Needed., Disp: , Rfl:   •  DONNATAL 16.2 MG per tablet, Take 1 tablet by mouth As Needed., Disp: , Rfl: 0  •  famotidine (PEPCID) 40 MG tablet, TK 1 T PO QD FOR HEARTBURN, Disp: , Rfl:   •  fluticasone (FLONASE) 50 MCG/ACT nasal spray, As needed, Disp: , Rfl: 5  •  LINZESS 145 MCG capsule capsule, TK 1 C PO D, Disp: , Rfl: 11  •  montelukast (SINGULAIR) 10 MG tablet, Take 10 mg by mouth Daily., Disp: , Rfl:   •  nebivolol (BYSTOLIC) 2.5 MG tablet, Take 1 tablet by mouth Daily., Disp: 90 tablet, Rfl: 3  •  nitrofurantoin, macrocrystal-monohydrate, (Macrobid) 100 MG capsule, Macrobid 100 mg capsule  1 tab daily as needed, Disp: , Rfl:   •  pantoprazole (PROTONIX) 40 MG EC tablet, Take  by mouth Daily., Disp: , Rfl: 3  •  rosuvastatin (CRESTOR) 5 MG tablet, Take 1 tablet by mouth Every Night. NEED LABS FOR FURTHER REFILLS, Disp: 90 tablet, Rfl: 0  •  sucralfate (CARAFATE) 1 g tablet, Take 1 g by mouth 4 (Four) Times a Day., Disp: , Rfl:   •  triamcinolone (KENALOG) 0.1 % cream, 2 (Two) Times a Day As Needed., Disp: , Rfl:   •  vitamin E 400 UNIT capsule, Take 400 Units by mouth Daily., Disp: , Rfl:     History of Present Illness: Patient returns for scheduled 7-month follow up. She has been feeling well overall from a cardiovascular standpoint. She has had substernal chest pressure for the past 2 days and believes this is related to gas. This improves with belching and essentially is resolving today. She unfortunately has continued to have frequent H. pylori infections. Patient denies shortness of breath, palpitations, edema, dizziness, and  "syncope. She has had no interim ER visits, hospitalizations, serious illnesses, or surgeries. She has received COVID immunization and plans to obtain the booster. She has annual laboratory studies and will have them collected in January 2022.      ROS   Obtained and negative except as outlined in problem list and HPI.    Procedures       Objective:       Vitals:    12/08/21 1452 12/08/21 1453   BP: 129/72 130/74   BP Location: Left arm Left arm   Patient Position: Sitting    Pulse: 68 72   SpO2: 97%    Weight: 65.3 kg (144 lb)    Height: 160 cm (63\")      Body mass index is 25.51 kg/m².  Last weight: 145 lbs    Vitals reviewed.   Constitutional:       Appearance: Well-developed.   Neck:      Thyroid: No thyromegaly.      Vascular: No carotid bruit or JVD.      Lymphadenopathy: No cervical adenopathy.   Pulmonary:      Effort: Pulmonary effort is normal.      Breath sounds: Normal breath sounds. No wheezing. No rhonchi. No rales.   Cardiovascular:      Regular rhythm.      No gallop. No S3 gallop.   Pulses:     Dorsalis pedis: 2+ bilaterally.     Posterior tibial: 2+ bilaterally.  Edema:     Peripheral edema absent.   Abdominal:      Palpations: Abdomen is soft. There is no abdominal mass.      Tenderness: There is no abdominal tenderness.   Musculoskeletal: Normal range of motion. Skin:     General: Skin is warm and dry.      Findings: No rash.   Neurological:      Mental Status: Alert and oriented to person, place, and time.           Lab Review:     No recent laboratory studies available for review today.        Assessment:       Overall continued acceptable course with no new interim cardiopulmonary complaints with good functional status. We will defer additional diagnostic or therapeutic intervention from a cardiac perspective at this time.     Diagnosis Plan   1. Palpitations  Stable and largely asymptomatic. Continue current treatment.   2. Dyslipidemia  No data to review. Continue rosuvastatin.   3. Family " history of lung cancer  Currently has a standing order for CT Chest.          Plan:         1. Patient to continue current medications and close follow up with the above providers.  2. Tentative cardiology follow up in July 2022 or patient may return sooner PRN.    I, Brandyn Wyatt, attest that this documentation has been prepared under the direction and in the presence of Tony Dominguez MD 12/08/2021    I, Tony Dominguez MD, [facility], personally performed the services described in this documentation as scribed by the above named individual in my presence, and it is both accurate and complete. At 15:15 EST on 12/08/2021

## 2022-07-06 NOTE — PROGRESS NOTES
Subjective:     Encounter Date:2022    Patient ID: Kandice Manning is a 62 y.o. 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     HEALTHCARE PROVIDER: MARIANA Cartagena  INFECTIOUS DISEASE: Liborio Cao MD  GASTROENTEROLOGIST: Steve Mccain MD  GENERAL SURGEON: Rodríguez Villatoro MD  PULMONOLOGIST: EMILY Child MD, [facility]  UROLOGIST: [facility]    Chief Complaint:   Chief Complaint   Patient presents with   • Palpitations       Problem List:  1. Intermittent palpitations with decongestant use, mild caffeine use, residual class I symptoms, with normal EKG:  a. Acceptable negative GXT following exercise to 104% PMHR and 128% predicted exercise capacity, 2018  b. Echocardiogram demonstrating mild MR and TR without pulmonary arterial hypertension or pericardial effusion or important valvular heart disease with normal LVEF (0.68), 2018  c. Residual class I symptoms with recent atypical intrascapular pain and increased heart burn with recent apparent acceptable electrocardiogram, 2020  d. Residual class I symptoms, 2021, 2022.  2. Hyperlipidemia; ASCVD 10-year risk 2.6%, 2.3% if treated  3. IBS  4. Chronic H. pylori infections  5. GERD  6. Former tobacco abuse; 1 pack per week ×3 years, quit in   7. Plantar fasciitis  8. Family history of early CAD  9. Situational stress with depression related to losing both parents and her brother within a 6-month period in , now with losing her youngest brother (her only remaining sibling) in 2020, who is now  2020  10. Right upper quadrant pain with HIDA scan demonstrating 17% function 2018  11. Fibrocystic breast disease, for which she takes vitamin E twice weekly  12. Kidney stones with contemplated lithotripsy subsequently discontinued-data deficit,   13. Surgical history:   a. WILL   b. Cholecystectomy, 2018  c. Remote benign  breast lumpectomy - data deficit  d. Lithotripsy    Allergies   Allergen Reactions   • Sulfa Antibiotics Other (See Comments)     Pass out   • Tylenol [Acetaminophen] Other (See Comments)     Pass out       Current Outpatient Medications:   •  albuterol sulfate  (90 Base) MCG/ACT inhaler, Inhale 2 puffs Every 4 (Four) Hours As Needed for Wheezing., Disp: 54 g, Rfl: 3  •  cetirizine (zyrTEC) 10 MG tablet, Take 25 mg by mouth As Needed., Disp: , Rfl:   •  DONNATAL 16.2 MG per tablet, Take 1 tablet by mouth As Needed., Disp: , Rfl: 0  •  famotidine (PEPCID) 40 MG tablet, TK 1 T PO QD FOR HEARTBURN, Disp: , Rfl:   •  fluticasone (FLONASE) 50 MCG/ACT nasal spray, As needed, Disp: , Rfl: 5  •  LINZESS 145 MCG capsule capsule, TK 1 C PO D, Disp: , Rfl: 11  •  montelukast (SINGULAIR) 10 MG tablet, Take 10 mg by mouth Daily., Disp: , Rfl:   •  nebivolol (BYSTOLIC) 2.5 MG tablet, Take 1 tablet by mouth Daily., Disp: 90 tablet, Rfl: 3  •  nitrofurantoin, macrocrystal-monohydrate, (MACROBID) 100 MG capsule, Macrobid 100 mg capsule  1 tab daily as needed, Disp: , Rfl:   •  pantoprazole (PROTONIX) 40 MG EC tablet, Take  by mouth Daily., Disp: , Rfl: 3  •  rosuvastatin (CRESTOR) 5 MG tablet, Take 1 tablet by mouth Every Night., Disp: 90 tablet, Rfl: 3  •  triamcinolone (KENALOG) 0.1 % cream, 2 (Two) Times a Day As Needed., Disp: , Rfl:   •  vitamin E 400 UNIT capsule, Take 400 Units by mouth Daily., Disp: , Rfl:     History of Present Illness: Patient returns for scheduled 7-month follow up.  She denies any chest pain, shortness of breath, dizziness, presyncope, or syncope.  She denies any palpitations or chest tightness other than when she had to take a steroid Dosepak after having an earache.  She was given antibiotics and she was told that she had some fluid behind her ear.  She had a lot of back pain and had some kidney stones.  She saw a urologist at [facility] and they went to do her lithotripsy and when they  "did it they found that she did not have any more stones and she had already passed them.  She was told that she has too much calcium.  Occasionally she will have some foot cramping at night and was encouraged to drink some Gatorade. She is active and asymptomatic. Patient has had no additional interim ER visits, hospitalizations, serious illnesses, or surgeries.         ROS   Obtained and negative except as outlined in problem list and HPI.    Procedures       Objective:       Vitals:    07/08/22 1505 07/08/22 1506   BP: 131/75 138/67   BP Location: Right arm Right arm   Patient Position: Sitting Standing   Pulse: 71 71   SpO2: 99% 99%   Weight: 64.8 kg (142 lb 12.8 oz) 64.8 kg (142 lb 12.8 oz)   Height: 160 cm (63\") 160 cm (63\")     Body mass index is 25.3 kg/m².  Last weight: 144 lbs    Vitals reviewed.   Constitutional:       Appearance: Well-developed.   Neck:      Thyroid: No thyromegaly.      Vascular: No carotid bruit or JVD.      Lymphadenopathy: No cervical adenopathy.   Pulmonary:      Effort: Pulmonary effort is normal.      Breath sounds: Normal breath sounds. No wheezing. No rhonchi. No rales.   Cardiovascular:      Regular rhythm.      No gallop. No S3 gallop.   Pulses:     Dorsalis pedis: 2+ bilaterally.     Posterior tibial: 2+ bilaterally.  Abdominal:      Palpations: Abdomen is soft. There is no abdominal mass.      Tenderness: There is no abdominal tenderness.   Musculoskeletal: Normal range of motion. Skin:     General: Skin is warm and dry.      Findings: No rash.   Neurological:      Mental Status: Alert and oriented to person, place, and time.           Lab Review:     No recent laboratory studies available for review today.    Lab date: 10 February 2022 (reviewed with patient via letter, recommended continuing current treatment)  • FLP: , TG 45, HDL 70, LDL 68  • CMP: Glu 91, BUN 13, Creat 0.74, eGFR 87, Na 141, K 5.2, Cl 105, CO2 26, Ca 10.5  • LFT: Alk Phos 71, AST 24, ALT 21, Bili " 0.60, Alb 5, Protein 7        Assessment:       Overall continued acceptable course with no new interim cardiopulmonary complaints with acceptable functional status. We will defer additional diagnostic or therapeutic intervention from a cardiac perspective at this time.     Diagnosis Plan   1. Palpitations  No persistent palpitations   2. Dyslipidemia  Acceptable lipid panel February 2022, continue rosuvastatin   3. Family history of lung cancer  Acceptable chest x ray May 2021          Plan:         1. Patient to continue current medications and close follow up with the above providers.  2. Patient is encouraged to implement a regular aerobic exercise routine, at least 30 minutes daily, 4-5 days per week.  3. Tentative cardiology follow up in March 2023 or patient may return sooner PRN.    Scribed for Tony Dominguez MD by Lizette Quinn, APRN. 7/8/2022  15:20 EDT    I, Tony Dominguez MD, [facility], personally performed the services described in this documentation as scribed by the above named individual in my presence, and it is both accurate and complete. At 15:48 EDT on 07/08/2022

## 2022-07-08 ENCOUNTER — OFFICE VISIT (OUTPATIENT)
Dept: CARDIOLOGY | Facility: CLINIC | Age: 62
End: 2022-07-08

## 2022-07-08 VITALS
HEIGHT: 63 IN | BODY MASS INDEX: 25.3 KG/M2 | DIASTOLIC BLOOD PRESSURE: 67 MMHG | HEART RATE: 71 BPM | OXYGEN SATURATION: 99 % | SYSTOLIC BLOOD PRESSURE: 138 MMHG | WEIGHT: 142.8 LBS

## 2022-07-08 DIAGNOSIS — Z80.1 FAMILY HISTORY OF LUNG CANCER: ICD-10-CM

## 2022-07-08 DIAGNOSIS — E78.5 DYSLIPIDEMIA: ICD-10-CM

## 2022-07-08 DIAGNOSIS — R00.2 PALPITATIONS: Primary | ICD-10-CM

## 2022-07-08 PROCEDURE — 99214 OFFICE O/P EST MOD 30 MIN: CPT | Performed by: INTERNAL MEDICINE

## 2022-07-21 RX ORDER — NEBIVOLOL 2.5 MG/1
2.5 TABLET ORAL DAILY
Qty: 7 TABLET | Refills: 0 | Status: SHIPPED | OUTPATIENT
Start: 2022-07-21 | End: 2022-12-28

## 2022-07-29 ENCOUNTER — TELEPHONE (OUTPATIENT)
Dept: CARDIOLOGY | Facility: CLINIC | Age: 62
End: 2022-07-29

## 2022-07-29 NOTE — TELEPHONE ENCOUNTER
Patient diagnosed with Covid. They prescribed her Paxlovid and pharmacist told her to stop taking her Crestor while she takes Paxlovid. Patient would like Dr. Dominguez's input.    2022: TC:147, Tri, HDL: 70, LDL: 68    Crestor 5mg daily.    Can she hold this medication for 5 days while she takes Paxlovid?

## 2022-09-09 DIAGNOSIS — R07.89 CHEST PAIN, ATYPICAL: Primary | ICD-10-CM

## 2022-09-15 ENCOUNTER — HOSPITAL ENCOUNTER (OUTPATIENT)
Dept: CT IMAGING | Facility: HOSPITAL | Age: 62
Discharge: HOME | End: 2022-09-15
Admitting: NURSE PRACTITIONER

## 2022-09-15 DIAGNOSIS — R07.89 CHEST PAIN, ATYPICAL: ICD-10-CM

## 2022-09-15 PROCEDURE — 71250 CT THORAX DX C-: CPT

## 2022-09-21 ENCOUNTER — OFFICE VISIT (OUTPATIENT)
Dept: PULMONOLOGY | Facility: CLINIC | Age: 62
End: 2022-09-21

## 2022-09-21 VITALS
TEMPERATURE: 97.4 F | BODY MASS INDEX: 25.3 KG/M2 | SYSTOLIC BLOOD PRESSURE: 130 MMHG | HEART RATE: 79 BPM | WEIGHT: 142.8 LBS | RESPIRATION RATE: 16 BRPM | DIASTOLIC BLOOD PRESSURE: 72 MMHG | HEIGHT: 63 IN | OXYGEN SATURATION: 99 %

## 2022-09-21 DIAGNOSIS — Z86.16 HISTORY OF COVID-19: ICD-10-CM

## 2022-09-21 DIAGNOSIS — Z80.1 FAMILY HISTORY OF LUNG CANCER: ICD-10-CM

## 2022-09-21 DIAGNOSIS — Z87.891 FORMER SMOKER: Primary | ICD-10-CM

## 2022-09-21 PROCEDURE — 99214 OFFICE O/P EST MOD 30 MIN: CPT | Performed by: NURSE PRACTITIONER

## 2022-09-21 RX ORDER — NEOMYCIN SULFATE, POLYMYXIN B SULFATE AND HYDROCORTISONE 10; 3.5; 1 MG/ML; MG/ML; [USP'U]/ML
SUSPENSION/ DROPS AURICULAR (OTIC)
COMMUNITY

## 2022-09-21 RX ORDER — BENZONATATE 100 MG/1
100 CAPSULE ORAL 3 TIMES DAILY PRN
Qty: 42 CAPSULE | Refills: 0 | Status: SHIPPED | OUTPATIENT
Start: 2022-09-21

## 2022-09-21 RX ORDER — FLUOXETINE 10 MG/1
CAPSULE ORAL
COMMUNITY

## 2022-09-21 NOTE — PROGRESS NOTES
"[facility] Pulmonary Follow up      Chief Complaint  Follow-up (6 month & CT review)    Subjective          Kandice Manning presents to [facility] for follow-up on her CT scan chest.  We have been following her to follow-up on CT scans of the chest.  She does have a family history of lung cancer as well as history of tobacco use.  She did quit 24 years ago.  Prior PFTs have been normal.    Since we saw her she did have COVID-19 in July.  She had her quite persistent cough during that episode they cough has significantly improved but not completely resolved.  She denied any sputum production or shortness of breath during that episode.          Objective     Vital Signs:   /72 (BP Location: Left arm, Patient Position: Sitting, Cuff Size: Adult)   Pulse 79   Temp 97.4 °F (36.3 °C) (Temporal)   Resp 16   Ht 160 cm (63\")   Wt 64.8 kg (142 lb 12.8 oz)   SpO2 99%   BMI 25.30 kg/m²       Immunization History   Administered Date(s) Administered   • COVID-19 (MODERNA) 1st, 2nd, 3rd Dose Only 04/21/2021, 05/27/2021   • COVID-19 (PFIZER) PURPLE CAP 04/17/2021   • Flu Vaccine Split Quad 11/01/2018, 10/01/2019       Physical Exam  Vitals reviewed.   Constitutional:       Appearance: She is well-developed.   HENT:      Head: Normocephalic and atraumatic.   Eyes:      Pupils: Pupils are equal, round, and reactive to light.   Cardiovascular:      Rate and Rhythm: Normal rate and regular rhythm.      Heart sounds: No murmur heard.  Pulmonary:      Effort: Pulmonary effort is normal. No respiratory distress.      Breath sounds: Normal breath sounds. No wheezing or rales.   Abdominal:      General: Bowel sounds are normal. There is no distension.      Palpations: Abdomen is soft.   Musculoskeletal:         General: Normal range of motion.      Cervical back: Normal range of motion and neck supple.   Skin:     General: Skin is warm and dry.      Findings: No erythema. "   Neurological:      Mental Status: She is alert and oriented to person, place, and time.   Psychiatric:         Behavior: Behavior normal.          Result Review :            FINDINGS:  The visualized soft tissue structures at the base of the neck including  the thyroid appear stable from prior examination. There is a benign  dystrophic calcification and small hypodense nodule within the right  lobe of the thyroid not meeting criteria for dedicated workup per ACR  incidental findings criteria. There is no lower cervical or axillary  adenopathy.     The heart size is normal. There is no pericardial effusion. The aorta is  normal in caliber without evidence of aneurysm formation. The main  pulmonary artery is normal in caliber. There is no mediastinal or hilar  lymphadenopathy by size criteria.     The tracheobronchial tree is patent. There is no abnormal bronchial wall  thickening or bronchiectasis. There is mild bandlike atelectasis within  the bilateral lower lobes and right middle lobe. There is no pleural  effusion or pneumothorax. There is no acute consolidation. There is  calcified granuloma within the superior segment of the right lower lobe.  There is no ground glass opacity to suggest residual Covid pneumonia.     The esophagus is normal in course and caliber. Visualized portions of  the upper abdomen demonstrates postcholecystectomy changes. There are  multilevel degenerative changes of the thoracic spine. No suspicious  lytic or sclerotic osseous lesion within the thorax.     IMPRESSION:  1. Bandlike linear atelectasis within the bilateral lower lobes and  lingula.  2. No evidence of persistent groundglass opacities to suggest residual  Covid pneumonia or evidence of fibrosis.           This report was finalized on 9/16/2022 3:30 PM by Ovi Baptiste MD.               Assessment and Plan    Problem List Items Addressed This Visit        Family History    Family history of lung cancer       Tobacco     Former smoker (None x 25 years) - Primary       Other    History of COVID-19    Overview     July 2022             We reviewed her CT scans today in the office.  She has had a stable CT scan now for 2 years with no signs of lung cancer.  We discussed further scanning today in the office, at this time she will defer since she has been stable.  She will follow-up here in the office as needed.      Follow Up     No follow-ups on file.  Patient was given instructions and counseling regarding her condition or for health maintenance advice. Please see specific information pulled into the AVS if appropriate.     I spent 35 minutes caring for Kadnice on this date of service. This time includes time spent by me in the following activities:preparing for the visit, reviewing tests, obtaining and/or reviewing a separately obtained history, performing a medically appropriate examination and/or evaluation , counseling and educating the patient/family/caregiver, referring and communicating with other health care professionals , documenting information in the medical record and independently interpreting results and communicating that information with the patient/family/caregiver        MARIANA Golden, ACNP  [facility]  Electronically signed

## 2022-09-22 PROBLEM — Z80.1 FAMILY HISTORY OF LUNG CANCER: Status: ACTIVE | Noted: 2022-09-22

## 2022-12-28 RX ORDER — ROSUVASTATIN CALCIUM 5 MG/1
5 TABLET, COATED ORAL NIGHTLY
Qty: 90 TABLET | Refills: 1 | Status: SHIPPED | OUTPATIENT
Start: 2022-12-28 | End: 2022-12-30 | Stop reason: SDUPTHER

## 2022-12-28 RX ORDER — NEBIVOLOL 2.5 MG/1
TABLET ORAL
Qty: 90 TABLET | Refills: 0 | Status: SHIPPED | OUTPATIENT
Start: 2022-12-28 | End: 2023-03-08 | Stop reason: SDUPTHER

## 2022-12-30 RX ORDER — ROSUVASTATIN CALCIUM 5 MG/1
5 TABLET, COATED ORAL NIGHTLY
Qty: 90 TABLET | Refills: 1 | Status: SHIPPED | OUTPATIENT
Start: 2022-12-30 | End: 2023-03-08 | Stop reason: SDUPTHER

## 2023-03-08 ENCOUNTER — OFFICE VISIT (OUTPATIENT)
Dept: CARDIOLOGY | Facility: CLINIC | Age: 63
End: 2023-03-08
Payer: COMMERCIAL

## 2023-03-08 VITALS
DIASTOLIC BLOOD PRESSURE: 66 MMHG | HEIGHT: 63 IN | WEIGHT: 141 LBS | HEART RATE: 97 BPM | OXYGEN SATURATION: 98 % | BODY MASS INDEX: 24.98 KG/M2 | SYSTOLIC BLOOD PRESSURE: 130 MMHG

## 2023-03-08 DIAGNOSIS — E78.5 DYSLIPIDEMIA: ICD-10-CM

## 2023-03-08 DIAGNOSIS — R07.89 CHEST PAIN, ATYPICAL: ICD-10-CM

## 2023-03-08 DIAGNOSIS — R06.02 SOB (SHORTNESS OF BREATH) ON EXERTION: ICD-10-CM

## 2023-03-08 DIAGNOSIS — R00.2 PALPITATIONS: Primary | ICD-10-CM

## 2023-03-08 PROCEDURE — 93000 ELECTROCARDIOGRAM COMPLETE: CPT | Performed by: NURSE PRACTITIONER

## 2023-03-08 PROCEDURE — 99214 OFFICE O/P EST MOD 30 MIN: CPT | Performed by: NURSE PRACTITIONER

## 2023-03-08 RX ORDER — OMEPRAZOLE MAGNESIUM, AMOXICILLIN AND RIFABUTIN 10; 250; 12.5 MG/1; MG/1; MG/1
CAPSULE, DELAYED RELEASE ORAL
COMMUNITY
Start: 2022-12-14

## 2023-03-08 RX ORDER — ESTRADIOL 0.1 MG/G
CREAM VAGINAL
COMMUNITY
Start: 2022-12-01

## 2023-03-08 RX ORDER — ROSUVASTATIN CALCIUM 5 MG/1
5 TABLET, COATED ORAL NIGHTLY
Qty: 90 TABLET | Refills: 3 | Status: SHIPPED | OUTPATIENT
Start: 2023-03-08

## 2023-03-08 RX ORDER — NEBIVOLOL 2.5 MG/1
2.5 TABLET ORAL DAILY
Qty: 90 TABLET | Refills: 3 | Status: SHIPPED | OUTPATIENT
Start: 2023-03-08 | End: 2023-03-08 | Stop reason: SDUPTHER

## 2023-03-08 RX ORDER — NEBIVOLOL 2.5 MG/1
2.5 TABLET ORAL DAILY
Qty: 90 TABLET | Refills: 3 | Status: SHIPPED | OUTPATIENT
Start: 2023-03-08

## 2023-03-28 ENCOUNTER — DOCUMENTATION (OUTPATIENT)
Dept: CARDIOLOGY | Facility: CLINIC | Age: 63
End: 2023-03-28
Payer: COMMERCIAL

## 2023-03-28 ENCOUNTER — HOSPITAL ENCOUNTER (OUTPATIENT)
Dept: CARDIOLOGY | Facility: HOSPITAL | Age: 63
Discharge: HOME | End: 2023-03-28
Admitting: NURSE PRACTITIONER
Payer: COMMERCIAL

## 2023-03-28 VITALS
WEIGHT: 142 LBS | SYSTOLIC BLOOD PRESSURE: 140 MMHG | HEART RATE: 78 BPM | DIASTOLIC BLOOD PRESSURE: 70 MMHG | HEIGHT: 62 IN | BODY MASS INDEX: 26.13 KG/M2

## 2023-03-28 DIAGNOSIS — I48.0 PAF (PAROXYSMAL ATRIAL FIBRILLATION): Primary | ICD-10-CM

## 2023-03-28 DIAGNOSIS — R07.89 CHEST PAIN, ATYPICAL: ICD-10-CM

## 2023-03-28 LAB
BH CV ECHO MEAS - AO ROOT DIAM: 2.8 CM
BH CV ECHO MEAS - EDV(CUBED): 76.2 ML
BH CV ECHO MEAS - EDV(MOD-SP2): 72.1 ML
BH CV ECHO MEAS - EDV(MOD-SP4): 67.5 ML
BH CV ECHO MEAS - EF(MOD-BP): 55.7 %
BH CV ECHO MEAS - EF(MOD-SP2): 57.4 %
BH CV ECHO MEAS - EF(MOD-SP4): 55 %
BH CV ECHO MEAS - ESV(CUBED): 19.1 ML
BH CV ECHO MEAS - ESV(MOD-SP2): 30.7 ML
BH CV ECHO MEAS - ESV(MOD-SP4): 30.4 ML
BH CV ECHO MEAS - FS: 36.9 %
BH CV ECHO MEAS - IVS/LVPW: 0.9 CM
BH CV ECHO MEAS - IVSD: 0.91 CM
BH CV ECHO MEAS - LA DIMENSION: 3.8 CM
BH CV ECHO MEAS - LV DIASTOLIC VOL/BSA (35-75): 40.8 CM2
BH CV ECHO MEAS - LV MASS(C)D: 130.6 GRAMS
BH CV ECHO MEAS - LV SYSTOLIC VOL/BSA (12-30): 18.4 CM2
BH CV ECHO MEAS - LVIDD: 4.2 CM
BH CV ECHO MEAS - LVIDS: 2.7 CM
BH CV ECHO MEAS - LVPWD: 1 CM
BH CV ECHO MEAS - MV DEC SLOPE: 396 CM/SEC2
BH CV ECHO MEAS - MV MAX PG: 4.5 MMHG
BH CV ECHO MEAS - MV MEAN PG: 2.6 MMHG
BH CV ECHO MEAS - MV P1/2T: 71.4 MSEC
BH CV ECHO MEAS - MV V2 VTI: 20.3 CM
BH CV ECHO MEAS - MVA(P1/2T): 3.1 CM2
BH CV ECHO MEAS - RAP SYSTOLE: 3 MMHG
BH CV ECHO MEAS - RVSP: 40 MMHG
BH CV ECHO MEAS - SI(MOD-SP2): 25.1 ML/M2
BH CV ECHO MEAS - SI(MOD-SP4): 22.4 ML/M2
BH CV ECHO MEAS - SV(MOD-SP2): 41.4 ML
BH CV ECHO MEAS - SV(MOD-SP4): 37.1 ML
BH CV ECHO MEAS - TR MAX PG: 36.5 MMHG
BH CV ECHO MEAS - TR MAX VEL: 301.9 CM/SEC
BH CV STRESS BP STAGE 1: NORMAL
BH CV STRESS BP STAGE 2: NORMAL
BH CV STRESS DURATION MIN STAGE 1: 3
BH CV STRESS DURATION MIN STAGE 2: 3
BH CV STRESS DURATION MIN STAGE 3: 0
BH CV STRESS DURATION SEC STAGE 1: 0
BH CV STRESS DURATION SEC STAGE 2: 0
BH CV STRESS DURATION SEC STAGE 3: 55
BH CV STRESS GRADE STAGE 1: 10
BH CV STRESS GRADE STAGE 2: 12
BH CV STRESS GRADE STAGE 3: 14
BH CV STRESS HR STAGE 1: 123
BH CV STRESS HR STAGE 2: 151
BH CV STRESS HR STAGE 3: 218
BH CV STRESS METS STAGE 1: 5
BH CV STRESS METS STAGE 2: 7.5
BH CV STRESS METS STAGE 3: 10
BH CV STRESS O2 STAGE 1: 99
BH CV STRESS O2 STAGE 2: 100
BH CV STRESS O2 STAGE 3: 99
BH CV STRESS PROTOCOL 1: NORMAL
BH CV STRESS RECOVERY BP: NORMAL MMHG
BH CV STRESS RECOVERY HR: 89 BPM
BH CV STRESS RECOVERY O2: 99 %
BH CV STRESS SPEED STAGE 1: 1.7
BH CV STRESS SPEED STAGE 2: 2.5
BH CV STRESS SPEED STAGE 3: 3.4
BH CV STRESS STAGE 1: 1
BH CV STRESS STAGE 2: 2
BH CV STRESS STAGE 3: 3
MAXIMAL PREDICTED HEART RATE: 158 BPM
PERCENT MAX PREDICTED HR: 137.97 %
STRESS BASELINE BP: NORMAL MMHG
STRESS BASELINE HR: 78 BPM
STRESS O2 SAT REST: 100 %
STRESS PERCENT HR: 162 %
STRESS POST ESTIMATED WORKLOAD: 8.4 METS
STRESS POST EXERCISE DUR MIN: 6 MIN
STRESS POST EXERCISE DUR SEC: 55 SEC
STRESS POST O2 SAT PEAK: 99 %
STRESS POST PEAK BP: NORMAL MMHG
STRESS POST PEAK HR: 218 BPM
STRESS TARGET HR: 134 BPM

## 2023-03-28 PROCEDURE — 93018 CV STRESS TEST I&R ONLY: CPT | Performed by: INTERNAL MEDICINE

## 2023-03-28 PROCEDURE — 93350 STRESS TTE ONLY: CPT

## 2023-03-28 PROCEDURE — 93352 ADMIN ECG CONTRAST AGENT: CPT | Performed by: INTERNAL MEDICINE

## 2023-03-28 PROCEDURE — 93320 DOPPLER ECHO COMPLETE: CPT

## 2023-03-28 PROCEDURE — 93017 CV STRESS TEST TRACING ONLY: CPT

## 2023-03-28 PROCEDURE — 93325 DOPPLER ECHO COLOR FLOW MAPG: CPT

## 2023-03-28 PROCEDURE — 25010000002 SULFUR HEXAFLUORIDE MICROSPH 60.7-25 MG RECONSTITUTED SUSPENSION: Performed by: NURSE PRACTITIONER

## 2023-03-28 PROCEDURE — 93350 STRESS TTE ONLY: CPT | Performed by: INTERNAL MEDICINE

## 2023-03-28 RX ADMIN — SULFUR HEXAFLUORIDE 5 ML: KIT at 09:01

## 2023-03-28 NOTE — PROGRESS NOTES
I called and talked to the patient about her stress echo results which showed new onset PAF in peak exercise. She understands about the risk of stroke and is agreeable to start Eliquis. She will need a prior authorization for Eliquis and I will give her samples when she comes into our office on 3/29/2023. She will also wear an E patch to assess a fib burden and this will be placed 3/29/2023 as well. She will get labs in 1 week after starting Eliquis. After obtaining labs and monitor results will consider adding torsemide 5 mg daily PRN and increasing bystolic dose.     Electronically signed by MARIANA Davila, 03/28/23, 6:27 PM EDT.

## 2023-03-29 ENCOUNTER — DOCUMENTATION (OUTPATIENT)
Dept: CARDIOLOGY | Facility: CLINIC | Age: 63
End: 2023-03-29
Payer: COMMERCIAL

## 2023-03-29 NOTE — PROGRESS NOTES
Eliquis 5mg approved on 3/29/2023. This request has been approved using information available on the patient's profile. CaseId:[id number];Status:Approved;Review Type:Prior Auth;Coverage Start Date:02/27/2023;Coverage End Date:03/28/2024.

## 2023-04-11 ENCOUNTER — LAB (OUTPATIENT)
Dept: LAB | Facility: HOSPITAL | Age: 63
End: 2023-04-11
Payer: COMMERCIAL

## 2023-04-11 ENCOUNTER — TELEPHONE (OUTPATIENT)
Dept: CARDIOLOGY | Facility: CLINIC | Age: 63
End: 2023-04-11
Payer: COMMERCIAL

## 2023-04-11 DIAGNOSIS — I48.0 PAF (PAROXYSMAL ATRIAL FIBRILLATION): ICD-10-CM

## 2023-04-11 LAB
ANION GAP SERPL CALCULATED.3IONS-SCNC: 10.9 MMOL/L (ref 5–15)
BUN SERPL-MCNC: 15 MG/DL (ref 8–23)
BUN/CREAT SERPL: 22.4 (ref 7–25)
CALCIUM SPEC-SCNC: 10.4 MG/DL (ref 8.6–10.5)
CHLORIDE SERPL-SCNC: 103 MMOL/L (ref 98–107)
CO2 SERPL-SCNC: 25.1 MMOL/L (ref 22–29)
CREAT SERPL-MCNC: 0.67 MG/DL (ref 0.57–1)
EGFRCR SERPLBLD CKD-EPI 2021: 99 ML/MIN/1.73
GLUCOSE SERPL-MCNC: 93 MG/DL (ref 65–99)
MAGNESIUM SERPL-MCNC: 2.1 MG/DL (ref 1.6–2.4)
POTASSIUM SERPL-SCNC: 4.5 MMOL/L (ref 3.5–5.2)
SODIUM SERPL-SCNC: 139 MMOL/L (ref 136–145)
TSH SERPL DL<=0.05 MIU/L-ACNC: 1.33 UIU/ML (ref 0.27–4.2)

## 2023-04-11 PROCEDURE — 84443 ASSAY THYROID STIM HORMONE: CPT

## 2023-04-11 PROCEDURE — 36415 COLL VENOUS BLD VENIPUNCTURE: CPT

## 2023-04-11 PROCEDURE — 80048 BASIC METABOLIC PNL TOTAL CA: CPT

## 2023-04-11 PROCEDURE — 83735 ASSAY OF MAGNESIUM: CPT

## 2023-04-11 NOTE — TELEPHONE ENCOUNTER
Pt called to let us know she had her labs drawn this AM. She also wanted to let us know she was prone to UTIs. ISABELA

## 2023-04-12 ENCOUNTER — TELEPHONE (OUTPATIENT)
Dept: CARDIOLOGY | Facility: CLINIC | Age: 63
End: 2023-04-12
Payer: COMMERCIAL

## 2023-04-12 NOTE — TELEPHONE ENCOUNTER
Called PT to advise the following information:    Can you please call her and let her know that all of her labs were normal? Also, I will let her know when I get the monitor results back. Thanks      No Answer left message with the information above. Told PT to call back if she has questions.

## 2023-04-17 ENCOUNTER — DOCUMENTATION (OUTPATIENT)
Dept: CARDIOLOGY | Facility: CLINIC | Age: 63
End: 2023-04-17
Payer: COMMERCIAL

## 2023-04-17 NOTE — PROGRESS NOTES
Holter monitor showed 123 episodes of supraventricular tachycardia with the longest 47 beats.One patient event associated with sinus tachycardia. There was no evidence of atrial fibrillation,  ventricular tachycardia, or heart block. She had rare PACs. Would increase Bystolic to 5mg daily to suppress the tachycardia and have her call back in 1-2 weeks with bp/HR and symptoms. If this has not improved with increased dose of Bystolic, would consider referral to EP.    Electronically signed by MARIANA Davila, 04/17/23, 7:41 PM EDT.

## 2023-04-18 ENCOUNTER — TELEPHONE (OUTPATIENT)
Dept: CARDIOLOGY | Facility: CLINIC | Age: 63
End: 2023-04-18
Payer: COMMERCIAL

## 2023-04-18 RX ORDER — NEBIVOLOL 5 MG/1
5 TABLET ORAL DAILY
Qty: 90 TABLET | Refills: 1 | Status: SHIPPED | OUTPATIENT
Start: 2023-04-18

## 2023-04-18 NOTE — TELEPHONE ENCOUNTER
Called patient regarding the following results:    Holter monitor showed 123 episodes of supraventricular tachycardia with the longest 47 beats.One patient event associated with sinus tachycardia. There was no evidence of atrial fibrillation,  ventricular tachycardia, or heart block. She had rare PACs. Would increase Bystolic to 5mg daily to suppress the tachycardia and have her call back in 1-2 weeks with bp/HR and symptoms. If this has not improved with increased dose of Bystolic, would consider referral to EP.    Patient is aware and agreeable to plan. Verbalized understanding. Sent in new script for Bystolic 5mg to [facility].

## 2023-04-19 ENCOUNTER — TELEPHONE (OUTPATIENT)
Dept: CARDIOLOGY | Facility: CLINIC | Age: 63
End: 2023-04-19
Payer: COMMERCIAL

## 2023-04-19 NOTE — TELEPHONE ENCOUNTER
Pt called stating that her PCP changed her anxiety medication to Lexapro and she wanted to make sure that it was ok with you that she changed it and if it would be ok to take along with her heart medications.

## 2023-04-20 ENCOUNTER — TELEPHONE (OUTPATIENT)
Dept: CARDIOLOGY | Facility: CLINIC | Age: 63
End: 2023-04-20
Payer: COMMERCIAL

## 2023-04-20 NOTE — TELEPHONE ENCOUNTER
Called PT Advised of the information from previous message.   Pt verbalizes understanding      PT also just wanted to let you know of a few BP readings      4/19  Yesterday before meds    126/75- (66)    4/20  After Meds AM    88/58 -(88)

## 2023-04-21 NOTE — TELEPHONE ENCOUNTER
It looks like her blood pressure may be getting too low with the increased Bystolic dose.  Was she symptomatic at all with the low blood pressure after meds?  Would go back to the 2.5 mg dose and monitor blood pressure and heart rate daily and call back in 1 week with readings please.

## 2023-04-25 ENCOUNTER — TELEPHONE (OUTPATIENT)
Dept: CARDIOLOGY | Facility: CLINIC | Age: 63
End: 2023-04-25
Payer: COMMERCIAL

## 2023-04-25 NOTE — TELEPHONE ENCOUNTER
Pt called stating she felt as if she was going to pass out at work earlier this morning. I spoke with her yesterday regarding her bystolic dose. Per MARIANA Gutierres, she wanted pt to cut the dose back to 2.5mg qd. I told patient to make sure she was hydrated and have some salt to keep her BP up. During our phone conversation, she stated she felt better and she would call to report any more low BP readings or any more symptoms.

## 2023-04-25 NOTE — TELEPHONE ENCOUNTER
Concur. Please have her continue monitoring bp/HR at home and call back next week with readings, and stay hydrated. Also, please remind the patient to take her time going from sitting to standing positions.

## 2023-08-07 ENCOUNTER — TELEPHONE (OUTPATIENT)
Dept: CARDIOLOGY | Facility: CLINIC | Age: 63
End: 2023-08-07

## 2023-08-07 NOTE — TELEPHONE ENCOUNTER
Caller: Kandice Manning    Relationship: Self    Best call back number: [phone]    What is the best time to reach you: ANY    Who are you requesting to speak with (clinical staff, provider,  specific staff member): CLINICAL        What was the call regarding: PATIENT IS REQUESTING SOME SAMPLES OF ELIQUIS. HER INSURANCE HAS A TEMPORARY LAPSE AND SHE IS ALMOST OUT. PLEASE CONTACT PATIENT AND ADVISE WHEN THESE ARE READY TO . PATIENT WILL BE OUT FIRST PART OF SEPTEMBER.    Is it okay if the provider responds through [employer]: PLEASE CALL

## 2023-08-09 ENCOUNTER — TELEPHONE (OUTPATIENT)
Dept: CARDIOLOGY | Facility: CLINIC | Age: 63
End: 2023-08-09

## 2023-08-09 NOTE — TELEPHONE ENCOUNTER
Caller: Kandice Manning    Relationship: Self    Best call back number: [phone]    What is the best time to reach you: ANYTIME     Who are you requesting to speak with (clinical staff, provider,  specific staff member): NICOLAS    What was the call regarding: PATIENT REQUESTING TO SPEAK TO NICOLAS REGARDING HER MEDICATIONS.

## 2023-09-12 PROBLEM — I48.0 PAF (PAROXYSMAL ATRIAL FIBRILLATION): Status: ACTIVE | Noted: 2023-09-12

## 2023-09-12 NOTE — PROGRESS NOTES
Subjective:     Encounter Date:09/29/2023      Patient ID: Kandice Manning is a 63 y.o. 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     HEALTHCARE PROVIDER: MARIANA Cartagena  INFECTIOUS DISEASE: Liborio Cao MD  GASTROENTEROLOGIST: Steve Mccain MD  GENERAL SURGEON: Rodríguez Villatoro MD  PULMONOLOGIST: EMILY Child MD, [facility]  UROLOGIST: [facility].    Chief Complaint:   Chief Complaint   Patient presents with    Atrial Fibrillation    Palpitations    Chest Pain     Problem List:  Intermittent palpitations with decongestant use, mild caffeine use, residual class I symptoms, with normal EKG:  Acceptable negative GXT following exercise to 104% PMHR and 128% predicted exercise capacity, April 2018  Echocardiogram demonstrating mild MR and TR without pulmonary arterial hypertension or pericardial effusion or important valvular heart disease with normal LVEF (0.68), April 2018  Residual class I symptoms with recent atypical intrascapular pain and increased heart burn with recent apparent acceptable electrocardiogram, October 2020  Residual class I symptoms, December 2021, July 2022, CCS class I chest discomfort/NYHA class I-II shortness of breath on exertion symptoms March 2023 with normal ECG.  Stress echocardiogram 3/28/2023: LVEF 55%, mild MR, mild TR, RVSP 40 mmHg, trivial pericardial effusion, CAROL of -4, no inducible ischemia, new onset A-fib, CHADsVasc 2, patient started on Eliquis  Holter monitor March 2023 showing 123 episodes of SVT with the longest 47 beats, no evidence of atrial fibrillation  Hyperlipidemia; ASCVD 10-year risk 2.6%, 2.3% if treated, on rosuvastatin  IBS  Chronic H. pylori infections  GERD  Former tobacco abuse; 1 pack per week ×3 years, quit in 1998  Plantar fasciitis  Family history of early CAD  Situational stress with depression related to losing both parents and her brother within a 6-month period in 2018, now  with losing her youngest brother (her only remaining sibling) in 2020, who is now  2020  Right upper quadrant pain with HIDA scan demonstrating 17% function 2018  Fibrocystic breast disease, for which she takes vitamin E twice weekly  Kidney stones with contemplated lithotripsy subsequently discontinued-data deficit,   Right 3 COVID 2022  Surgical history:   WILL   Cholecystectomy, 2018  Remote benign breast lumpectomy - data deficit  Lithotripsy     Allergies   Allergen Reactions    Codeine Other (See Comments)     syncope    Sulfa Antibiotics Other (See Comments)     Pass out    Tylenol [Acetaminophen] Other (See Comments)     Pass out       Current Outpatient Medications   Medication Instructions    albuterol sulfate  (90 Base) MCG/ACT inhaler 2 puffs, Inhalation, Every 4 Hours PRN    apixaban (ELIQUIS) 5 mg, Oral, 2 Times Daily    benzonatate (TESSALON) 100 mg, Oral, 3 Times Daily PRN    cetirizine (ZYRTEC) 25 mg, Oral, As Needed    estradiol (ESTRACE) 0.1 MG/GM vaginal cream INSERT 1 GRAM VAGINALLY 1 TIME A WEEK FOR 2 WEEKS THEN USE VAGINALLY 2 TIMES A WEEK    fluticasone (FLONASE) 50 MCG/ACT nasal spray As needed    LINZESS 145 MCG capsule capsule TK 1 C PO D    nebivolol (BYSTOLIC) 5 mg, Oral, Daily    neomycin-polymyxin-hydrocortisone (CORTISPORIN) 3.5-52842-3 otic suspension neomycin-polymyxin-hydrocort 3.5 mg-10,000 unit/mL-1 % ear drops,susp   INSTILL 4 DROPS INTO AFFECTED EAR(S) BY OTIC ROUTE 3 TIMES PER DAY x 7 days    nitrofurantoin, macrocrystal-monohydrate, (MACROBID) 100 MG capsule Macrobid 100 mg capsule   1 tab daily as needed    pantoprazole (PROTONIX) 40 MG EC tablet Oral, Daily    rosuvastatin (CRESTOR) 5 mg, Oral, Nightly    Talicia 250-12.5-10 MG capsule delayed-release TAKE 4 CAPSULES BY MOUTH EVERY 8 HOURS FOR 14 DAYS    triamcinolone (KENALOG) 0.1 % cream 2 Times Daily PRN         HISTORY OF PRESENT ILLNESS:  The patient is here for 6-month  "follow-up.She is followed by pulmonology and had a CTA chest September 2022 showing bandlike linear atelectasis in the bilateral lower lobes and lingula.  Patient had a stress echocardiogram which was negative for ischemia but did show new onset atrial fibrillation.  She was started on Eliquis.  Patient called our office in April 2023 with lightheadedness/presyncope and Bystolic was decreased to 2.5 mg daily.  She has not had any recurrent dizzy episodes since switching to the 2.5 mg dose.  She recently retired early August and says that as soon as she retired she started walking a lot.  She would walk 2 miles at a time.  During the first week she felt like occasionally she would have some chest tightness, shortness of breath, and palpitations when she would push it too fast.  She says at first she was walking 6 days a week.  She has noticed that she is not having as many episodes of this now that she has gotten used to walking more frequently.  She sometimes will take a day in between her activity and feels like her symptoms have improved.  She describes the chest sensation as tightness.  She has an inhaler to use and has asthma and felt like this was contributing to her symptoms.  She would occasionally use an inhaler with improvement.  She will let me know if she has continued chest tightness/shortness of breath.  She denies any edema, presyncope, or syncope.  Overall the patient feels well.  She has had increased stress because her daughter has a goiter that needs to be removed soon.    ROS   All other systems reviewed and otherwise negative.    Procedures       Objective:       Vitals:    09/29/23 0851 09/29/23 0904   BP: 118/68 124/70   BP Location: Right arm Right arm   Patient Position: Sitting Standing   Cuff Size: Adult Adult   Pulse: 70 72   SpO2: 96% 98%   Weight: 67.1 kg (148 lb)    Height: 157.5 cm (62.01\")      Body mass index is 27.06 kg/m².  Last weight March 2023 was 141 pounds  Constitutional:     "   Appearance: Healthy appearance. Not in distress.   Neck:      Vascular: No JVR. JVD normal.   Pulmonary:      Effort: Pulmonary effort is normal.      Breath sounds: Normal breath sounds. No wheezing. No rhonchi. No rales.   Chest:      Chest wall: Not tender to palpatation.   Cardiovascular:      PMI at left midclavicular line. Normal rate. Regular rhythm. Normal S1. Normal S2.       Murmurs: There is no murmur.      No gallop.  No click. No rub.   Pulses:     Intact distal pulses.   Edema:     Peripheral edema absent.   Abdominal:      General: Bowel sounds are normal.      Palpations: Abdomen is soft.      Tenderness: There is no abdominal tenderness.   Musculoskeletal: Normal range of motion.         General: No tenderness. Skin:     General: Skin is warm and dry.   Neurological:      General: No focal deficit present.      Mental Status: Alert and oriented to person, place and time.         Lab Review:   Lab Results   Component Value Date    GLUCOSE 93 04/11/2023    BUN 15 04/11/2023    CREATININE 0.67 04/11/2023    BCR 22.4 04/11/2023    CO2 25.1 04/11/2023    CALCIUM 10.4 04/11/2023       Lab Results   Component Value Date    TSH 1.330 04/11/2023       Advance Care Planning   ACP discussion was held with the patient during this visit. Patient does not have an advance directive, declines further assistance.              Assessment:     Overall continued acceptable course with no new interim cardiopulmonary complaints with acceptable functional status. We will defer additional diagnostic or therapeutic intervention from a cardiac perspective at this time.       Diagnosis Plan   1. Palpitations  No sustained palpitations, continue Bystolic      2. Dyslipidemia  No new labs to review, continue rosuvastatin      3. Chest pain, atypical for angina  Acceptable stress echocardiogram March 2023      4. PAF (paroxysmal atrial fibrillation)  No sustained palpitations, continue Bystolic, Eliquis             Plan:          Patient to continue current medications and close follow up with the above providers.  Tentative cardiology follow up in March 2024 or patient may return sooner PRN.    Electronically signed by MARIANA Norman, 09/29/23, 9:42 AM EDT.

## 2023-09-29 ENCOUNTER — OFFICE VISIT (OUTPATIENT)
Dept: CARDIOLOGY | Facility: CLINIC | Age: 63
End: 2023-09-29
Payer: COMMERCIAL

## 2023-09-29 VITALS
BODY MASS INDEX: 27.23 KG/M2 | WEIGHT: 148 LBS | HEIGHT: 62 IN | SYSTOLIC BLOOD PRESSURE: 124 MMHG | HEART RATE: 72 BPM | OXYGEN SATURATION: 98 % | DIASTOLIC BLOOD PRESSURE: 70 MMHG

## 2023-09-29 DIAGNOSIS — I48.0 PAF (PAROXYSMAL ATRIAL FIBRILLATION): ICD-10-CM

## 2023-09-29 DIAGNOSIS — R00.2 PALPITATIONS: Primary | ICD-10-CM

## 2023-09-29 DIAGNOSIS — R07.89 CHEST PAIN, ATYPICAL: ICD-10-CM

## 2023-09-29 DIAGNOSIS — E78.5 DYSLIPIDEMIA: ICD-10-CM

## 2023-09-29 PROCEDURE — 99214 OFFICE O/P EST MOD 30 MIN: CPT | Performed by: NURSE PRACTITIONER

## 2023-09-29 RX ORDER — SUCRALFATE 1 G/1
1 TABLET ORAL 3 TIMES DAILY
COMMUNITY
End: 2023-09-29

## 2023-09-29 RX ORDER — NEBIVOLOL 2.5 MG/1
2.5 TABLET ORAL DAILY
Qty: 90 TABLET | Refills: 3 | Status: SHIPPED | OUTPATIENT
Start: 2023-09-29

## 2023-12-04 ENCOUNTER — TELEPHONE (OUTPATIENT)
Dept: CARDIOLOGY | Facility: CLINIC | Age: 63
End: 2023-12-04
Payer: COMMERCIAL

## 2023-12-04 NOTE — TELEPHONE ENCOUNTER
Called PT back with recommendations per MARIANA Gutierres in regards to teeth cleaning while on blood thinners    it is ok during her dental cleaning.     Pt verbalized understanding

## 2023-12-04 NOTE — TELEPHONE ENCOUNTER
Caller: Kandice Manning    Relationship: Self    Best call back number: [phone]    What is the best time to reach you: ANYTIME     Who are you requesting to speak with (clinical staff, provider,  specific staff member): ANYONE     What was the call regarding: PATIENT IS HAVING TOOTH CLEANING IN JANUARY AND WOULD LIKE TO KNOW IF SHE CAN BE ON NITROGEN WHILE SHE IS ON HER BLOOD THINNER.

## 2023-12-28 ENCOUNTER — OFFICE VISIT (OUTPATIENT)
Dept: PULMONOLOGY | Facility: CLINIC | Age: 63
End: 2023-12-28
Payer: COMMERCIAL

## 2023-12-28 ENCOUNTER — TELEPHONE (OUTPATIENT)
Dept: PULMONOLOGY | Facility: CLINIC | Age: 63
End: 2023-12-28

## 2023-12-28 VITALS
SYSTOLIC BLOOD PRESSURE: 106 MMHG | HEART RATE: 72 BPM | HEIGHT: 62 IN | WEIGHT: 151.8 LBS | TEMPERATURE: 98 F | DIASTOLIC BLOOD PRESSURE: 72 MMHG | BODY MASS INDEX: 27.94 KG/M2 | OXYGEN SATURATION: 98 %

## 2023-12-28 DIAGNOSIS — J30.89 SEASONAL AND PERENNIAL ALLERGIC RHINITIS: ICD-10-CM

## 2023-12-28 DIAGNOSIS — R05.9 COUGH, UNSPECIFIED TYPE: Primary | ICD-10-CM

## 2023-12-28 DIAGNOSIS — K21.9 CHRONIC GERD: ICD-10-CM

## 2023-12-28 DIAGNOSIS — J30.2 SEASONAL AND PERENNIAL ALLERGIC RHINITIS: ICD-10-CM

## 2023-12-28 PROCEDURE — 99214 OFFICE O/P EST MOD 30 MIN: CPT | Performed by: NURSE PRACTITIONER

## 2023-12-28 RX ORDER — BENZONATATE 200 MG/1
200 CAPSULE ORAL 3 TIMES DAILY PRN
Qty: 42 CAPSULE | Refills: 3 | Status: SHIPPED | OUTPATIENT
Start: 2023-12-28

## 2023-12-28 RX ORDER — FLUTICASONE PROPIONATE AND SALMETEROL 100; 50 UG/1; UG/1
1 POWDER RESPIRATORY (INHALATION)
Qty: 60 EACH | Refills: 3 | Status: SHIPPED | OUTPATIENT
Start: 2023-12-28 | End: 2023-12-29

## 2023-12-28 RX ORDER — FLUTICASONE PROPIONATE AND SALMETEROL 100; 50 UG/1; UG/1
1 POWDER RESPIRATORY (INHALATION)
Qty: 60 EACH | Refills: 5 | Status: SHIPPED | OUTPATIENT
Start: 2023-12-28 | End: 2023-12-29

## 2023-12-28 NOTE — TELEPHONE ENCOUNTER
PA completed for Fluticasone-Salmeterol (ADVAIR/WIXELA) 100-50 MCG/ACT DISKUS 1 puff twice daily. Determination pending. Key is [id number]

## 2023-12-28 NOTE — PROGRESS NOTES
"[facility] Pulmonary Follow up      Chief Complaint  Follow-up and Cough    Subjective          Kandice Manning presents to [facility] for routine annual follow-up.  I initially started seeing her for an abnormal CT scan of the chest with a family history of lung cancer, we followed the CT scan for 2 years and she had some bilateral lower lobe atelectasis but no other worrisome changes.    Today she comes in because she has been having quite a bit of a worsening cough.  She was diagnosed with A-fib this year.  She also had COVID-19 earlier in the year and since then she has had quite a bit of a worsening cough.  The cough is usually dry hacking but very occasionally will come up with clear sputum.  She is also complaining of some right-sided pleuritic pain.  She does complain of some tightness in her chest but denies any audible wheezing.  The cough will worsen when she lies flat.    She does have chronic reflux disease and takes a PPI daily.  She does notice some occasional reflux symptoms.  She does sleep propped up with an adjustable bed.    She denies any significant worsening postnasal drainage or seasonal allergy symptoms.  She continues on her antihistamine and nasal sprays daily.        Objective     Vital Signs:   /72   Pulse 72   Temp 98 °F (36.7 °C) (Infrared)   Ht 157.5 cm (62.01\")   Wt 68.9 kg (151 lb 12.8 oz)   SpO2 98% Comment: room air at rest  BMI 27.76 kg/m²       Immunization History   Administered Date(s) Administered    31-influenza Vac Quardvalent Preservativ 11/01/2018, 10/01/2019    COVID-19 (MODERNA) 1st,2nd,3rd Dose Monovalent 04/21/2021, 05/27/2021    COVID-19 (MODERNA) BIVALENT 12+YRS 10/21/2022    COVID-19 (MODERNA) Monovalent Original Booster 12/10/2021    COVID-19 (PFIZER) Purple Cap Monovalent 04/17/2021    COVID-19 F23 (MODERNA) 12YRS+ (SPIKEVAX) 12/01/2023    Flu Vaccine Split Quad 11/01/2018, 10/01/2019    Flublok 18+yrs " 10/03/2023    Fluzone (or Fluarix & Flulaval for VFC) >6mos 10/01/2021    Influenza Injectable Mdck Pf Quad 10/21/2022    Influenza Seasonal Injectable 09/27/2013       Physical Exam  Vitals reviewed.   Constitutional:       Appearance: She is well-developed.   HENT:      Head: Normocephalic and atraumatic.      Mouth/Throat:      Pharynx: Posterior oropharyngeal erythema present.   Eyes:      Pupils: Pupils are equal, round, and reactive to light.   Cardiovascular:      Rate and Rhythm: Normal rate and regular rhythm.      Heart sounds: No murmur heard.  Pulmonary:      Effort: Pulmonary effort is normal. No respiratory distress.      Breath sounds: Normal breath sounds. No wheezing or rales.   Abdominal:      General: Bowel sounds are normal. There is no distension.      Palpations: Abdomen is soft.   Musculoskeletal:         General: Normal range of motion.      Cervical back: Normal range of motion and neck supple.   Skin:     General: Skin is warm and dry.      Findings: No erythema.   Neurological:      Mental Status: She is alert and oriented to person, place, and time.   Psychiatric:         Behavior: Behavior normal.          Result Review :            PFTs done in the office today:  No obstruction or restriction, normal PFTs    PA and lateral chest x-ray done the office today reviewed by me  Awaiting final MD interpretation                 Assessment and Plan    Problem List Items Addressed This Visit          Allergies and Adverse Reactions    Perennial allergic rhinitis       Gastrointestinal Abdominal     Chronic GERD     Other Visit Diagnoses       Cough, unspecified type    -  Primary    Relevant Orders    XR Chest PA & Lateral          We did discuss her worsening cough today.  Her PFTs are normal.  But she still having quite a bit of symptoms with chest tightness and a cough.  I think it be beneficial to start her on a long-acting medication.  She has used albuterol in the past but with her A-fib  she tries to avoid using it.  I will go ahead and put her on a low-dose fluticasone salmeterol inhaler, we did discuss watching for any heart issues with the medicine.    We also discussed continued management of her chronic drainage with her antihistamines and nasal sprays as well as her chronic reflux with her PPI and reflux precautions.  Encouraged her to continue to elevate the head of her bed at night and no eating or drinking prior to bed.    Go ahead and give her some Tessalon Perles to use as needed to help with cough suppression, the cough is quite troublesome especially when she is in public.    Follow-up if no improvement in the cough after the above management.      Follow Up     Return if symptoms worsen or fail to improve.  Patient was given instructions and counseling regarding her condition or for health maintenance advice. Please see specific information pulled into the AVS if appropriate.       Moderate level of Medical Decision Making complexity based on 2 or more chronic stable illnesses and prescription drug management.    MARIANA Golden, ACNP  [facility], [place] [facility]  Electronically signed

## 2023-12-28 NOTE — TELEPHONE ENCOUNTER
Insurance also kicked, WIXELA. Called pt and informed. Told pt she would have to call her insurance to get the names of the preferred alternatives. Pt verbalized understanding and stated she'd call back when she got the alternatives.

## 2023-12-28 NOTE — TELEPHONE ENCOUNTER
Pt called back and gave a me a list of alternatives. However, she stated that her insurance said all of these would still need a PA.    Breo Ellipta  AirDuo Digihaler  Dulera  Symbicort    Please advise as to which one I should send in as an alternative at your earliest convenience.

## 2023-12-29 RX ORDER — FLUTICASONE FUROATE AND VILANTEROL 200; 25 UG/1; UG/1
1 POWDER RESPIRATORY (INHALATION)
Qty: 1 EACH | Refills: 5 | Status: SHIPPED | OUTPATIENT
Start: 2023-12-29

## 2024-01-03 RX ORDER — MOMETASONE FUROATE AND FORMOTEROL FUMARATE DIHYDRATE 100; 5 UG/1; UG/1
2 AEROSOL RESPIRATORY (INHALATION)
Qty: 1 EACH | Refills: 11 | Status: SHIPPED | OUTPATIENT
Start: 2024-01-03

## 2024-01-03 RX ORDER — BUDESONIDE AND FORMOTEROL FUMARATE DIHYDRATE 160; 4.5 UG/1; UG/1
2 AEROSOL RESPIRATORY (INHALATION) 2 TIMES DAILY
Qty: 1 EACH | Refills: 5 | Status: SHIPPED | OUTPATIENT
Start: 2024-01-03

## 2024-02-21 ENCOUNTER — TELEPHONE (OUTPATIENT)
Dept: ORTHOPEDIC SURGERY | Facility: CLINIC | Age: 64
End: 2024-02-21
Payer: COMMERCIAL

## 2024-02-21 NOTE — TELEPHONE ENCOUNTER
Patient called back, all of the three places [facility] provided require a new RX for custom orthotics. Would Dr Townsend be willing to write a new script?

## 2024-02-21 NOTE — TELEPHONE ENCOUNTER
Hub staff attempted to follow warm transfer process and was unsuccessful     Caller: PATIENT    Relationship to patient: SELF    Best call back number: [phone]    Patient is needing: PARAMJIT WAS CALLING IN REGARDS TO WANTING TO KNOW WHO DR. JAMISON USES FOR CUSTOM INSERTS. PARAMJIT SEEN DR. JAMISON AT LEAST 10 YEARS AGO AND THE PERSON THAT DR. JAMISON RECOMMENDED AT THAT TIME IS NO LONGER AVAILABLE. PARAMJIT WANTED TO KNOW WHO DR. JAMISON USES SO SHE CAN CONTACT THEM TO GET NEW INSERTS. I SPOKE WITH KRAIG AND WAS ADVISED TO SEND A MESSAGE IN REGARDS TO THIS QUESTION. THANK YOU!

## 2024-02-21 NOTE — TELEPHONE ENCOUNTER
I spoke with the patient to let her know that she can call [facility]   [facility] or [facility]. I gave her the addresses of all 3 and phone numbers.    Mone FAY) ROT

## 2024-02-21 NOTE — TELEPHONE ENCOUNTER
Vivienne can you write a new orthotic Rx and give to me tomorrow.    Mone PEACE (R) ROT        Patient wants to try [facility]. I will fax order to them.

## 2024-02-22 ENCOUNTER — TELEPHONE (OUTPATIENT)
Dept: CARDIOLOGY | Facility: CLINIC | Age: 64
End: 2024-02-22
Payer: COMMERCIAL

## 2024-02-22 NOTE — TELEPHONE ENCOUNTER
Patient still has molds and just needs new Rx because she was going to [facility].  I explained that it had been a while since she was seen and she is asking for a new script and doesn't feel like there are any changes to her feet.    Mone PEACE (R) ROT

## 2024-02-22 NOTE — TELEPHONE ENCOUNTER
Pt called needing  cardiac clearance to hold her eliquis for 5-7 days for upcoming GI scope on 3/7/34 with Dr Mccain.    PT denies any current cardiac issues     Please Advise

## 2024-02-22 NOTE — TELEPHONE ENCOUNTER
She has not been seen in many years.  She would need to come in for evaluation prior to getting a new prescription for orthotics.

## 2024-03-25 NOTE — PROGRESS NOTES
Subjective:     Encounter Date:03/29/2024      Patient ID: Kandice Manning is a 63 y.o. 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     HEALTHCARE PROVIDER: MARIANA Cartagena  INFECTIOUS DISEASE: Liborio Cao MD  GASTROENTEROLOGIST: Steve Mccain MD  GENERAL SURGEON: Rodríguez Villatoro MD  PULMONOLOGIST: EMILY Child MD, [facility]  UROLOGIST: [facility].    Chief Complaint:   Chief Complaint   Patient presents with    Palpitations     Problem List:  Intermittent palpitations with decongestant use, mild caffeine use, residual class I symptoms, with normal EKG:  Acceptable negative GXT following exercise to 104% PMHR and 128% predicted exercise capacity, April 2018  Echocardiogram demonstrating mild MR and TR without pulmonary arterial hypertension or pericardial effusion or important valvular heart disease with normal LVEF (0.68), April 2018  Residual class I symptoms with recent atypical intrascapular pain and increased heart burn with recent apparent acceptable electrocardiogram, October 2020  Residual class I symptoms, December 2021, July 2022, CCS class I chest discomfort/NYHA class I-II shortness of breath on exertion symptoms March 2023 with normal ECG.  Stress echocardiogram 3/28/2023: LVEF 55%, mild MR, mild TR, RVSP 40 mmHg, trivial pericardial effusion, CAROL of -4, no inducible ischemia, new onset A-fib, CHADsVasc 2, patient started on Eliquis  Holter monitor March 2023 showing 123 episodes of SVT with the longest 47 beats, no evidence of atrial fibrillation  Hyperlipidemia; ASCVD 10-year risk 2.6%, 2.3% if treated, on rosuvastatin  IBS  Chronic H. pylori infections  GERD  Former tobacco abuse; 1 pack per week ×3 years, quit in 1998  Plantar fasciitis  Family history of early CAD  Situational stress with depression related to losing both parents and her brother within a 6-month period in 2018, now with losing her youngest brother (her  only remaining sibling) in 2020, who is now  2020  Right upper quadrant pain with HIDA scan demonstrating 17% function 2018  Fibrocystic breast disease, for which she takes vitamin E twice weekly  Kidney stones with contemplated lithotripsy subsequently discontinued-data deficit,   COVID 2022  Surgical history:   WILL   Cholecystectomy, 2018  Remote benign breast lumpectomy - data deficit  Lithotripsy    Allergies   Allergen Reactions    Codeine Other (See Comments)     syncope    Sulfa Antibiotics Other (See Comments)     Pass out    Tylenol [Acetaminophen] Other (See Comments)     Pass out       Current Outpatient Medications   Medication Instructions    apixaban (ELIQUIS) 5 mg, Oral, 2 Times Daily    benzonatate (TESSALON) 200 mg, Oral, 3 Times Daily PRN    cetirizine (ZYRTEC) 25 mg, Oral, As Needed    estradiol (ESTRACE) 0.1 MG/GM vaginal cream INSERT 1 GRAM VAGINALLY 1 TIME A WEEK FOR 2 WEEKS THEN USE VAGINALLY 2 TIMES A WEEK    fluticasone (FLONASE) 50 MCG/ACT nasal spray As needed    LINZESS 145 MCG capsule capsule TK 1 C PO D    nebivolol (BYSTOLIC) 2.5 mg, Oral, Daily    neomycin-polymyxin-hydrocortisone (CORTISPORIN) 3.5-42768-7 otic suspension neomycin-polymyxin-hydrocort 3.5 mg-10,000 unit/mL-1 % ear drops,susp   INSTILL 4 DROPS INTO AFFECTED EAR(S) BY OTIC ROUTE 3 TIMES PER DAY x 7 days    pantoprazole (PROTONIX) 40 MG EC tablet Oral, Daily    rosuvastatin (CRESTOR) 5 mg, Oral, Nightly    triamcinolone (KENALOG) 0.1 % cream 2 Times Daily PRN         HISTORY OF PRESENT ILLNESS:  Patient is here for 6-month follow-up.  She had a stress echocardiogram 3/28/2023 which showed LVEF 55%, mild MR, mild TR, RVSP 40 mmHg, trivial pericardial effusion, CAROL of -4, no inducible ischemia, new onset A-fib, CHADsVasc 2, patient started on Eliquis.  The patient had her EGD and was not found to have any gastric ulcers.  She has had recurrent episodes of H. pylori and follows closely  "with gastroenterology.  There was mention of possibly having to go to the [facility] by one of her physicians but she has not gone there yet.  She denies any chest pain, shortness of breath, dizziness, presyncope, or syncope.  Sometimes when she takes her Eliquis if she takes it on empty stomach she has some heartburn but she tries to take it with food now.  She walks 2.5 miles about 4-5 times a week.  She will have her next laboratory testing results sent to me for review.  She rarely feels any palpitations.  Most the time at home when she has checked her blood pressure it has been in the 120s systolic.  Recheck blood pressure right arm sitting in office today was 130/60.  She will keep monitoring this at home.  The patient is contemplating starting on BuSpar for anxiety.    ROS   All other systems reviewed and otherwise negative.      ECG 12 Lead    Date/Time: 3/29/2024 11:33 AM  Performed by: Lizette Quinn APRN    Authorized by: Lizette Quinn APRN  Rhythm comments: Normal sinus rhythm, normal ECG, 61 bpm, QRS 84 ms, QTc 404 ms,  ms, no change from last ECG March 2023             Objective:       Vitals:    03/29/24 1111 03/29/24 1112 03/29/24 1130   BP: 158/64 148/70 130/60   BP Location: Right arm Right arm Right arm   Patient Position: Sitting Standing Sitting   Pulse: 63 72    SpO2: 99%     Weight: 68 kg (150 lb)     Height: 160 cm (63\")       Body mass index is 26.57 kg/m².  Wt Readings from Last 2 Encounters:   03/29/24 68 kg (150 lb)   12/28/23 68.9 kg (151 lb 12.8 oz)        Constitutional:       Appearance: Healthy appearance. Not in distress.   Neck:      Vascular: No JVR. JVD normal.   Pulmonary:      Effort: Pulmonary effort is normal.      Breath sounds: Normal breath sounds. No wheezing. No rhonchi. No rales.   Chest:      Chest wall: Not tender to palpatation.   Cardiovascular:      PMI at left midclavicular line. Normal rate. Regular rhythm. Normal S1. Normal S2.       Murmurs: There " is no murmur.      No gallop.  No click. No rub.   Pulses:     Intact distal pulses.   Edema:     Peripheral edema absent.   Abdominal:      General: Bowel sounds are normal.      Palpations: Abdomen is soft.      Tenderness: There is no abdominal tenderness.   Musculoskeletal: Normal range of motion.         General: No tenderness. Skin:     General: Skin is warm and dry.   Neurological:      General: No focal deficit present.      Mental Status: Alert and oriented to person, place and time.           Lab Review:   Lab Results   Component Value Date    GLUCOSE 93 04/11/2023    BUN 15 04/11/2023    CREATININE 0.67 04/11/2023    BCR 22.4 04/11/2023    CO2 25.1 04/11/2023    CALCIUM 10.4 04/11/2023         Lab Results   Component Value Date    TSH 1.330 04/11/2023           Results for orders placed during the hospital encounter of 03/28/23    Adult Stress Echo W/ Cont or Stress Agent if Necessary Per Protocol    Interpretation Summary    Left ventricular systolic function is normal. Calculated left ventricular EF = 55%.    Mild mitral and tricuspid regurgitation.    Calculated right ventricular systolic pressure from tricuspid regurgitation is 40 mmHg.    There is a trivial pericardial effusion.    Pt. c/o chest tightness at peak exercise.  Dyspnea started in stage one and continued throughout the test.    Expected exercise duration = 6:35 Actual = 6:55  CAROL = ( - 4) stopped due to rhythm change    THR of 134 bpm achieved at 4 minutes    Approximately 1 mm of horizontal ST segment depression was seen in the inferior and lateral leads with a heart rate of greater than 200 bpm.    Appropriate augmentation was seen in all myocardial segments with exercise.    The patient developed atrial fibrillation with RVR and aberrancy at peak exercise.    No evidence of inducible ischemia by echocardiographic criteria.  The ST segment change as well as the patient's chest tightness were felt secondary to A-fib with RVR.             Advance Care Planning   ACP discussion was held with the patient during this visit. Patient has an advance directive (not in EMR), copy requested.      Assessment:     Overall continued acceptable course with no new interim cardiopulmonary complaints with acceptable functional status. We will defer additional diagnostic or therapeutic intervention from a cardiac perspective at this time. She had a stress echocardiogram 3/28/2023 which showed LVEF 55%, mild MR, mild TR, RVSP 40 mmHg, trivial pericardial effusion, CAROL of -4, no inducible ischemia, new onset A-fib, CHADsVasc 2, patient started on Eliquis.Hopefully I will get to review the patient's next laboratory testing results.  Patient's ECG was normal in office today.       Diagnosis Plan   1. PAF (paroxysmal atrial fibrillation)  Continue Bystolic, Eliquis      2. Mixed hyperlipidemia  No new labs to review, continue rosuvastatin      3. Chest pain, atypical for angina  No recurrent angina pectoris or CHF on current activity schedule; continue current treatment              Plan:         Patient to continue current medications and close follow up with the above providers.  Tentative cardiology follow up in September 2024 or patient may return sooner PRN.      Electronically signed by MARIANA Norman, 03/29/24, 11:34 AM EDT.

## 2024-03-29 ENCOUNTER — OFFICE VISIT (OUTPATIENT)
Dept: CARDIOLOGY | Facility: CLINIC | Age: 64
End: 2024-03-29
Payer: COMMERCIAL

## 2024-03-29 VITALS
BODY MASS INDEX: 26.58 KG/M2 | SYSTOLIC BLOOD PRESSURE: 130 MMHG | OXYGEN SATURATION: 99 % | HEART RATE: 72 BPM | HEIGHT: 63 IN | DIASTOLIC BLOOD PRESSURE: 60 MMHG | WEIGHT: 150 LBS

## 2024-03-29 DIAGNOSIS — E78.2 MIXED HYPERLIPIDEMIA: ICD-10-CM

## 2024-03-29 DIAGNOSIS — I48.0 PAF (PAROXYSMAL ATRIAL FIBRILLATION): Primary | ICD-10-CM

## 2024-03-29 DIAGNOSIS — R07.89 CHEST PAIN, ATYPICAL: ICD-10-CM

## 2024-03-29 PROCEDURE — 93000 ELECTROCARDIOGRAM COMPLETE: CPT | Performed by: NURSE PRACTITIONER

## 2024-03-29 PROCEDURE — 99214 OFFICE O/P EST MOD 30 MIN: CPT | Performed by: NURSE PRACTITIONER

## 2024-06-14 ENCOUNTER — TELEPHONE (OUTPATIENT)
Dept: CARDIOLOGY | Facility: CLINIC | Age: 64
End: 2024-06-14
Payer: COMMERCIAL

## 2024-06-14 NOTE — TELEPHONE ENCOUNTER
Patient LVM stating PCP had changed anxiety medication to venlafaxine. Patient would like to know if it is okay to take.

## 2024-07-22 ENCOUNTER — CLINICAL SUPPORT (OUTPATIENT)
Dept: CARDIOLOGY | Facility: CLINIC | Age: 64
End: 2024-07-22
Payer: COMMERCIAL

## 2024-07-22 DIAGNOSIS — R00.2 PALPITATIONS: Primary | ICD-10-CM

## 2024-07-23 ENCOUNTER — DOCUMENTATION (OUTPATIENT)
Dept: CARDIOLOGY | Facility: CLINIC | Age: 64
End: 2024-07-23
Payer: COMMERCIAL

## 2024-07-23 NOTE — PROGRESS NOTES
Patient with atypical chest pain under her right breast.  ECG normal in office today.  Patient had stress echo March 2023 with no evidence of ischemia.  ECG was also normal in March 2024.  Blood pressure was elevated when she came in for walk-in ECG.  168/80, heart rate 63 bpm sitting, 148/74, 65 bpm standing.  Patient to monitor blood pressure for the next 3 days and call back on Friday with readings.  May need to add additional antihypertensive medication. I will have JUDY Izaguirre call the patient back with results/recommendations.     Electronically signed by MARIANA Norman, 07/23/24, 9:26 AM EDT.

## 2024-09-03 ENCOUNTER — TELEPHONE (OUTPATIENT)
Dept: CARDIOLOGY | Facility: CLINIC | Age: 64
End: 2024-09-03
Payer: COMMERCIAL

## 2024-09-26 NOTE — PROGRESS NOTES
Subjective:     Encounter Date:10/02/2024      Patient ID: Kandice Manning is a 64 y.o.  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     HEALTHCARE PROVIDER: MARIANA Cartagena  INFECTIOUS DISEASE: Liborio Cao MD  GASTROENTEROLOGIST: Steve Mccain MD  GENERAL SURGEON: Rodríguez Villatoro MD  PULMONOLOGIST: EMILY Child MD, [facility]  UROLOGIST: [facility].    Chief Complaint:   Chief Complaint   Patient presents with    PAF (paroxysmal atrial fibrillation)     Problem List:  Intermittent palpitations:  Decongestant use, mild caffeine use, residual class I symptoms, with normal EKG  Acceptable negative GXT following exercise to 104% PMHR and 128% predicted exercise capacity, April 2018  Echocardiogram demonstrating mild MR and TR without pulmonary arterial hypertension or pericardial effusion or important valvular heart disease with normal LVEF (0.68), April 2018  Residual class I symptoms with recent atypical intrascapular pain and increased heart burn with recent apparent acceptable electrocardiogram, October 2020  Residual class I symptoms, December 2021, July 2022, CCS class I chest discomfort/NYHA class I-II shortness of breath on exertion symptoms March 2023 with normal ECG.  Stress echocardiogram 3/28/2023: LVEF 55%, mild MR, mild TR, RVSP 40 mmHg, trivial pericardial effusion, CAROL of -4, no inducible ischemia, new onset A-fib, CHADsVasc 2, patient started on Eliquis  Holter monitor March 2023 showing 123 episodes of SVT with the longest 47 beats, no evidence of atrial fibrillation  Hyperlipidemia; ASCVD 10-year risk 2.6%, 2.3% if treated, on rosuvastatin  IBS  Chronic H. pylori infections  GERD  Former tobacco abuse; 1 pack per week ×3 years, quit in 1998  Plantar fasciitis  Family history of early CAD  Situational stress with depression related to losing both parents and her brother within a 6-month period in 2018, now with losing her  youngest brother (her only remaining sibling) in 2020, who is now  2020  Right upper quadrant pain with HIDA scan demonstrating 17% function 2018  Fibrocystic breast disease, for which she takes vitamin E twice weekly  Kidney stones with contemplated lithotripsy subsequently discontinued-data deficit,   COVID 2022  Surgical history:   WILL   Cholecystectomy, 2018  Remote benign breast lumpectomy - data deficit  Lithotripsy    Allergies   Allergen Reactions    Codeine Other (See Comments)     syncope    Sulfa Antibiotics Other (See Comments)     Pass out    Tylenol [Acetaminophen] Other (See Comments)     Pass out       Current Outpatient Medications   Medication Instructions    apixaban (ELIQUIS) 5 mg, Oral, 2 Times Daily    benzonatate (TESSALON) 200 mg, Oral, 3 Times Daily PRN    cetirizine (ZYRTEC) 25 mg, Oral, As Needed    estradiol (ESTRACE) 0.1 MG/GM vaginal cream INSERT 1 GRAM VAGINALLY 1 TIME A WEEK FOR 2 WEEKS THEN USE VAGINALLY 2 TIMES A WEEK    fluticasone (FLONASE) 50 MCG/ACT nasal spray As needed    LINZESS 145 MCG capsule capsule TK 1 C PO D    nebivolol (BYSTOLIC) 2.5 mg, Oral, Daily    neomycin-polymyxin-hydrocortisone (CORTISPORIN) 3.5-25648-7 otic suspension     pantoprazole (PROTONIX) 40 MG EC tablet Oral, Daily    rosuvastatin (CRESTOR) 5 mg, Oral, Nightly    triamcinolone (KENALOG) 0.1 % cream 2 Times Daily PRN         HISTORY OF PRESENT ILLNESS:  The patient is here for 6-month follow-up.She had a stress echocardiogram 3/28/2023 which showed LVEF 55%, mild MR, mild TR, RVSP 40 mmHg, trivial pericardial effusion, CAROL of -4, no inducible ischemia, new onset A-fib, CHADsVasc 2, patient started on Eliquis.  The patient was started on Lexapro a couple months ago and says that she developed severe vomiting and epigastric chest pain.  She stopped taking it and symptoms resolved.  Her PCP wanted to start her on a lower dose of this but she is hesitant.  Other than  "this isolated incident the patient has not had any chest pain, shortness of breath, palpitations, presyncope, or syncope.  Blood pressures have been WNL.  She noticed that if she switched her Bystolic to during the day rather than at night then her blood pressures were better.  The patient notices that she urinates more frequently now than she used to.  She walks 2 miles 3 times a week without difficulties.  She says that she always stays active.  She has upcoming labs soon with her PCP.      ROS   All other systems reviewed and otherwise negative.    Procedures       Objective:       Vitals:    10/02/24 1336 10/02/24 1337   BP: 126/66 122/78   BP Location: Right arm Right arm   Patient Position: Sitting Sitting   Pulse: 76 83   SpO2: 100% 100%   Weight: 67.7 kg (149 lb 3.2 oz)    Height: 160 cm (63\")      Body mass index is 26.43 kg/m².  Wt Readings from Last 2 Encounters:   10/02/24 67.7 kg (149 lb 3.2 oz)   03/29/24 68 kg (150 lb)        Constitutional:       Appearance: Healthy appearance. Not in distress.   Neck:      Vascular: No JVR. JVD normal.   Pulmonary:      Effort: Pulmonary effort is normal.      Breath sounds: Normal breath sounds. No wheezing. No rhonchi. No rales.   Chest:      Chest wall: Not tender to palpatation.   Cardiovascular:      PMI at left midclavicular line. Normal rate. Regular rhythm. Normal S1. Normal S2.       Murmurs: There is no murmur.      No gallop.  No click. No rub.   Pulses:     Intact distal pulses.   Edema:     Peripheral edema absent.   Abdominal:      General: Bowel sounds are normal.      Palpations: Abdomen is soft.      Tenderness: There is no abdominal tenderness.   Musculoskeletal: Normal range of motion.         General: No tenderness. Skin:     General: Skin is warm and dry.   Neurological:      General: No focal deficit present.      Mental Status: Alert and oriented to person, place and time.           Lab Review:   Lab Results   Component Value Date    GLUCOSE " 93 04/11/2023    BUN 15 04/11/2023    CREATININE 0.67 04/11/2023    BCR 22.4 04/11/2023    CO2 25.1 04/11/2023    CALCIUM 10.4 04/11/2023       Lab Results   Component Value Date    TSH 1.330 04/11/2023                 Results for orders placed during the hospital encounter of 03/28/23    Adult Stress Echo W/ Cont or Stress Agent if Necessary Per Protocol    Interpretation Summary    Left ventricular systolic function is normal. Calculated left ventricular EF = 55%.    Mild mitral and tricuspid regurgitation.    Calculated right ventricular systolic pressure from tricuspid regurgitation is 40 mmHg.    There is a trivial pericardial effusion.    Pt. c/o chest tightness at peak exercise.  Dyspnea started in stage one and continued throughout the test.    Expected exercise duration = 6:35 Actual = 6:55  CAROL = ( - 4) stopped due to rhythm change    THR of 134 bpm achieved at 4 minutes    Approximately 1 mm of horizontal ST segment depression was seen in the inferior and lateral leads with a heart rate of greater than 200 bpm.    Appropriate augmentation was seen in all myocardial segments with exercise.    The patient developed atrial fibrillation with RVR and aberrancy at peak exercise.    No evidence of inducible ischemia by echocardiographic criteria.  The ST segment change as well as the patient's chest tightness were felt secondary to A-fib with RVR.            Advance Care Planning   ACP discussion was held with the patient during this visit. Patient does not have an advance directive, declines further assistance.      Assessment:     Overall continued acceptable course with no new interim cardiopulmonary complaints with acceptable functional status. We will defer additional diagnostic or therapeutic intervention from a cardiac perspective at this time. She had a stress echocardiogram 3/28/2023 which showed LVEF 55%, mild MR, mild TR, RVSP 40 mmHg, trivial pericardial effusion, CAROL of -4, no inducible ischemia,  new onset A-fib, CHADsVasc 2, patient started on Eliquis. Hopefully I will get to review the patient's next laboratory testing results.       Diagnosis Plan   1. PAF (paroxysmal atrial fibrillation)  Stable, continue Eliquis, Bystolic      2. Mixed hyperlipidemia  No new labs to review, continue rosuvastatin      3. Chest pain, atypical for angina  No recurrent angina pectoris or CHF on current activity schedule; continue current treatment              Plan:         Patient to continue current medications and close follow up with the above providers.  Tentative cardiology follow up in April 2025 or patient may return sooner PRN.      Electronically signed by MARIANA Norman, 10/02/24, 2:09 PM EDT.

## 2024-10-02 ENCOUNTER — OFFICE VISIT (OUTPATIENT)
Dept: CARDIOLOGY | Facility: CLINIC | Age: 64
End: 2024-10-02
Payer: COMMERCIAL

## 2024-10-02 VITALS
WEIGHT: 149.2 LBS | BODY MASS INDEX: 26.44 KG/M2 | SYSTOLIC BLOOD PRESSURE: 122 MMHG | OXYGEN SATURATION: 100 % | DIASTOLIC BLOOD PRESSURE: 78 MMHG | HEIGHT: 63 IN | HEART RATE: 83 BPM

## 2024-10-02 DIAGNOSIS — R07.89 CHEST PAIN, ATYPICAL: ICD-10-CM

## 2024-10-02 DIAGNOSIS — E78.2 MIXED HYPERLIPIDEMIA: ICD-10-CM

## 2024-10-02 DIAGNOSIS — I48.0 PAF (PAROXYSMAL ATRIAL FIBRILLATION): Primary | ICD-10-CM

## 2024-10-02 PROCEDURE — 99214 OFFICE O/P EST MOD 30 MIN: CPT | Performed by: NURSE PRACTITIONER

## 2024-10-02 RX ORDER — NEBIVOLOL 2.5 MG/1
2.5 TABLET ORAL DAILY
Qty: 90 TABLET | Refills: 3 | Status: SHIPPED | OUTPATIENT
Start: 2024-10-02

## 2024-12-16 ENCOUNTER — DOCUMENTATION (OUTPATIENT)
Dept: CARDIOLOGY | Facility: CLINIC | Age: 64
End: 2024-12-16
Payer: COMMERCIAL

## 2024-12-16 NOTE — PROGRESS NOTES
Reviewed the patient's laboratory testing results.  Electrolytes, kidney function, and liver function were normal except calcium was elevated.  Would recommend her to get repeat calcium level in a couple months with her PCP and see if it is still elevated +/- PTH, possible referral to endocrinology.  Would continue current cardiac medications.  Cholesterol levels and TSH were normal.I will have JUDY Izaguirre call the patient back with results/recommendations.       Electronically signed by MARIANA Norman, 12/16/24, 1:41 PM EST.     12/3/2024:  CMP: Glucose 99, BUN 11, creatinine 0.68, sodium 142, potassium 4.7, chloride 106, carbon dioxide 26, calcium 10.6, protein 7.6, albumin 4.8, globulin 2.8, bilirubin 0.6, alkaline phosphatase 71, AST 18, ALT 13, GFR 97  Lipid panel: Cholesterol 159, triglycerides 57, HDL 68, LDL 80  TSH 1.76

## 2025-03-25 NOTE — PROGRESS NOTES
Subjective:     Encounter Date:04/04/2025      Patient ID: Kandice Manning is a 64 y.o. 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     HEALTHCARE PROVIDER: MARIANA Cartagena  INFECTIOUS DISEASE: Liborio Cao MD  GASTROENTEROLOGIST: Steve Mccain MD  GENERAL SURGEON: Rodríguez Villatoro MD  PULMONOLOGIST: EMILY Child MD, [facility]  UROLOGIST: [facility].    Chief Complaint:   Chief Complaint   Patient presents with    PAF (paroxysmal atrial fibrillation)     Problem List:  Intermittent palpitations/paroxysmal atrial fibrillation:  Decongestant use, mild caffeine use, residual class I symptoms, with normal EKG  Acceptable negative GXT following exercise to 104% PMHR and 128% predicted exercise capacity, April 2018  Echocardiogram demonstrating mild MR and TR without pulmonary arterial hypertension or pericardial effusion or important valvular heart disease with normal LVEF (0.68), April 2018  Residual class I symptoms with recent atypical intrascapular pain and increased heart burn with recent apparent acceptable electrocardiogram, October 2020  Residual class I symptoms, December 2021, July 2022, CCS class I chest discomfort/NYHA class I-II shortness of breath on exertion symptoms March 2023 with normal ECG.  Stress echocardiogram 3/28/2023: LVEF 55%, mild MR, mild TR, RVSP 40 mmHg, trivial pericardial effusion, CAROL of -4, no inducible ischemia, new onset A-fib, CHADsVasc 2, patient started on Eliquis  Holter monitor March 2023 showing 123 episodes of SVT with the longest 47 beats, no evidence of atrial fibrillation  Hyperlipidemia; ASCVD 10-year risk 2.6%, 2.3% if treated, on rosuvastatin  IBS  Chronic H. pylori infections  GERD  Former tobacco abuse; 1 pack per week ×3 years, quit in 1998  Plantar fasciitis  Family history of early CAD  Situational stress with depression related to losing both parents and her brother within a 6-month period  in , now with losing her youngest brother (her only remaining sibling) in 2020, who is now  2020  Right upper quadrant pain with HIDA scan demonstrating 17% function 2018  Fibrocystic breast disease, for which she takes vitamin E twice weekly  Kidney stones with contemplated lithotripsy subsequently discontinued-data deficit,   COVID 2022  Surgical history:   WILL   Cholecystectomy, 2018  Remote benign breast lumpectomy - data deficit  Lithotripsy    Allergies   Allergen Reactions    Codeine Other (See Comments)     syncope    Sulfa Antibiotics Other (See Comments)     Pass out    Tylenol [Acetaminophen] Other (See Comments)     Pass out       Current Outpatient Medications   Medication Instructions    apixaban (ELIQUIS) 5 mg, Oral, 2 Times Daily    benzonatate (TESSALON) 200 mg, Oral, 3 Times Daily PRN    cetirizine (ZYRTEC) 25 mg, As Needed    estradiol (ESTRACE) 0.1 MG/GM vaginal cream INSERT 1 GRAM VAGINALLY 1 TIME A WEEK FOR 2 WEEKS THEN USE VAGINALLY 2 TIMES A WEEK    fluticasone (FLONASE) 50 MCG/ACT nasal spray As needed    LINZESS 145 MCG capsule capsule TK 1 C PO D    nebivolol (BYSTOLIC) 2.5 mg, Oral, Daily    neomycin-polymyxin-hydrocortisone (CORTISPORIN) 3.5-58212-7 otic suspension     pantoprazole (PROTONIX) 40 MG EC tablet Daily    rosuvastatin (CRESTOR) 5 mg, Oral, Nightly    triamcinolone (KENALOG) 0.1 % cream 2 Times Daily PRN         HISTORY OF PRESENT ILLNESS:  The patient is here for 6-month follow-up.She had a stress echocardiogram 3/28/2023 which showed LVEF 55%, mild MR, mild TR, RVSP 40 mmHg, trivial pericardial effusion, CAROL of -4, no inducible ischemia.  Patient has not had any repeat labs since 2024.  She says that she sometimes takes extra antacids though at home.  She has not noticed as much atrial fib as she used to have.  She says that she can always tell when she is  in it because she has a little chest discomfort at the time.  She  "stays very active and is always on the move.  She was trying to walk around 1.5-2 miles.  She was doing this and then going to do all the yard work and is now doing 1 or the other and not both on the same day.  She denies any increased palpitations, shortness of breath, chest pain, dizziness, presyncope, or syncope.  Blood pressures at home have been in the 120s systolic.    ROS   All other systems reviewed and otherwise negative.    Procedures       Objective:       Vitals:    04/04/25 1311 04/04/25 1313 04/04/25 1332   BP: 142/73 144/74 134/64   BP Location: Left arm Left arm Right arm   Patient Position: Sitting Standing Sitting   Cuff Size: Large Adult Large Adult    Pulse: 65 66    SpO2: 98% 98%    Weight: 68.5 kg (151 lb) 68.5 kg (151 lb)    Height: 160 cm (63\") 160 cm (63\")      Body mass index is 26.75 kg/m².  Wt Readings from Last 2 Encounters:   04/04/25 68.5 kg (151 lb)   10/02/24 67.7 kg (149 lb 3.2 oz)        Constitutional:       Appearance: Healthy appearance. Not in distress.   Neck:      Vascular: No JVR. JVD normal.   Pulmonary:      Effort: Pulmonary effort is normal.      Breath sounds: Normal breath sounds. No wheezing. No rhonchi. No rales.   Chest:      Chest wall: Not tender to palpatation.   Cardiovascular:      PMI at left midclavicular line. Normal rate. Regular rhythm. Normal S1. Normal S2.       Murmurs: There is no murmur.      No gallop.  No click. No rub.   Pulses:     Intact distal pulses.   Edema:     Peripheral edema absent.   Abdominal:      General: Bowel sounds are normal.      Palpations: Abdomen is soft.      Tenderness: There is no abdominal tenderness.   Musculoskeletal: Normal range of motion.         General: No tenderness. Skin:     General: Skin is warm and dry.   Neurological:      General: No focal deficit present.      Mental Status: Alert and oriented to person, place and time.           Lab Review:   Lab Results   Component Value Date    GLUCOSE 93 04/11/2023    BUN " 15 04/11/2023    CREATININE 0.67 04/11/2023    BCR 22.4 04/11/2023    CO2 25.1 04/11/2023    CALCIUM 10.4 04/11/2023         Lab Results   Component Value Date    TSH 1.330 04/11/2023           Results for orders placed during the hospital encounter of 03/28/23    Adult Stress Echo W/ Cont or Stress Agent if Necessary Per Protocol    Interpretation Summary    Left ventricular systolic function is normal. Calculated left ventricular EF = 55%.    Mild mitral and tricuspid regurgitation.    Calculated right ventricular systolic pressure from tricuspid regurgitation is 40 mmHg.    There is a trivial pericardial effusion.    Pt. c/o chest tightness at peak exercise.  Dyspnea started in stage one and continued throughout the test.    Expected exercise duration = 6:35 Actual = 6:55  CAROL = ( - 4) stopped due to rhythm change    THR of 134 bpm achieved at 4 minutes    Approximately 1 mm of horizontal ST segment depression was seen in the inferior and lateral leads with a heart rate of greater than 200 bpm.    Appropriate augmentation was seen in all myocardial segments with exercise.    The patient developed atrial fibrillation with RVR and aberrancy at peak exercise.    No evidence of inducible ischemia by echocardiographic criteria.  The ST segment change as well as the patient's chest tightness were felt secondary to A-fib with RVR.        12/3/2024:  CMP: Glucose 99, BUN 11, creatinine 0.68, sodium 142, potassium 4.7, chloride 106, carbon dioxide 26, calcium 10.6, protein 7.6, albumin 4.8, globulin 2.8, bilirubin 0.6, alkaline phosphatase 71, AST 18, ALT 13, GFR 97  Lipid panel: Cholesterol 159, triglycerides 57, HDL 68, LDL 80  TSH 1.76    Advance Care Planning   ACP discussion was held with the patient during this visit. Patient does not have an advance directive, declines further assistance.      Assessment:     Overall continued acceptable course with no new interim cardiopulmonary complaints with acceptable  functional status. We will defer additional diagnostic or therapeutic intervention from a cardiac perspective at this time. She had a stress echocardiogram 3/28/2023 which showed LVEF 55%, mild MR, mild TR, RVSP 40 mmHg, trivial pericardial effusion, CAROL of -4, no inducible ischemia.  Will plan on stress test and echocardiogram in 2026.     Diagnosis Plan   1. PAF (paroxysmal atrial fibrillation)  Stable, continue Bystolic, Eliquis      2. Mixed hyperlipidemia  Acceptable lipid panel December 2024, continue rosuvastatin      3. Chest pain, atypical for angina  No recurrent angina pectoris or CHF on current activity schedule; continue current treatment              Plan:         Patient to continue current medications and close follow up with the above providers.  Tentative cardiology follow up in October 2025 or patient may return sooner PRN.  BMP to recheck calcium level , PTH +/- referral to endocrinology dependent on results      Electronically signed by MARIANA Norman, 04/04/25, 1:33 PM EDT.

## 2025-04-04 ENCOUNTER — OFFICE VISIT (OUTPATIENT)
Dept: CARDIOLOGY | Facility: CLINIC | Age: 65
End: 2025-04-04
Payer: MEDICARE

## 2025-04-04 ENCOUNTER — LAB (OUTPATIENT)
Dept: LAB | Facility: HOSPITAL | Age: 65
End: 2025-04-04
Payer: MEDICARE

## 2025-04-04 ENCOUNTER — TRANSCRIBE ORDERS (OUTPATIENT)
Dept: LAB | Facility: HOSPITAL | Age: 65
End: 2025-04-04
Payer: MEDICARE

## 2025-04-04 VITALS
HEIGHT: 63 IN | BODY MASS INDEX: 26.75 KG/M2 | SYSTOLIC BLOOD PRESSURE: 134 MMHG | HEART RATE: 66 BPM | DIASTOLIC BLOOD PRESSURE: 64 MMHG | OXYGEN SATURATION: 98 % | WEIGHT: 151 LBS

## 2025-04-04 DIAGNOSIS — E78.2 MIXED HYPERLIPIDEMIA: ICD-10-CM

## 2025-04-04 DIAGNOSIS — R07.89 CHEST PAIN, ATYPICAL: ICD-10-CM

## 2025-04-04 DIAGNOSIS — E83.52 HYPERCALCEMIA: ICD-10-CM

## 2025-04-04 DIAGNOSIS — I48.0 PAF (PAROXYSMAL ATRIAL FIBRILLATION): Primary | ICD-10-CM

## 2025-04-04 LAB
ANION GAP SERPL CALCULATED.3IONS-SCNC: 10.5 MMOL/L (ref 5–15)
BUN SERPL-MCNC: 10 MG/DL (ref 8–23)
BUN/CREAT SERPL: 13.7 (ref 7–25)
CALCIUM SPEC-SCNC: 10.4 MG/DL (ref 8.6–10.5)
CALCIUM SPEC-SCNC: 10.8 MG/DL (ref 8.6–10.5)
CHLORIDE SERPL-SCNC: 107 MMOL/L (ref 98–107)
CO2 SERPL-SCNC: 24.5 MMOL/L (ref 22–29)
CREAT SERPL-MCNC: 0.73 MG/DL (ref 0.57–1)
EGFRCR SERPLBLD CKD-EPI 2021: 92 ML/MIN/1.73
GLUCOSE SERPL-MCNC: 97 MG/DL (ref 65–99)
POTASSIUM SERPL-SCNC: 4.6 MMOL/L (ref 3.5–5.2)
PTH-INTACT SERPL-MCNC: 78.2 PG/ML (ref 15–65)
SODIUM SERPL-SCNC: 142 MMOL/L (ref 136–145)

## 2025-04-04 PROCEDURE — 83970 ASSAY OF PARATHORMONE: CPT

## 2025-04-04 PROCEDURE — 36415 COLL VENOUS BLD VENIPUNCTURE: CPT

## 2025-04-04 PROCEDURE — 80048 BASIC METABOLIC PNL TOTAL CA: CPT

## 2025-04-07 ENCOUNTER — DOCUMENTATION (OUTPATIENT)
Dept: CARDIOLOGY | Facility: CLINIC | Age: 65
End: 2025-04-07
Payer: MEDICARE

## 2025-04-07 NOTE — PROGRESS NOTES
Elevated calcium and PTH levels...Patient to be referred to endocrinology fro hyperparathyroidism. I will have JUDY Izaguirre call the patient back with results/recommendations and to send in referral.    Electronically signed by MARIANA Norman, 04/07/25, 8:45 AM EDT.

## 2025-04-08 DIAGNOSIS — E21.3 HYPERPARATHYROIDISM: Primary | ICD-10-CM

## 2025-04-09 RX ORDER — NEBIVOLOL 2.5 MG/1
2.5 TABLET ORAL DAILY
Qty: 90 TABLET | Refills: 3 | Status: SHIPPED | OUTPATIENT
Start: 2025-04-09

## 2025-04-09 RX ORDER — ROSUVASTATIN CALCIUM 5 MG/1
5 TABLET, COATED ORAL NIGHTLY
Qty: 90 TABLET | Refills: 3 | Status: SHIPPED | OUTPATIENT
Start: 2025-04-09

## 2025-06-18 ENCOUNTER — RESULTS FOLLOW-UP (OUTPATIENT)
Dept: ENDOCRINOLOGY | Facility: CLINIC | Age: 65
End: 2025-06-18

## 2025-06-18 ENCOUNTER — OFFICE VISIT (OUTPATIENT)
Dept: ENDOCRINOLOGY | Facility: CLINIC | Age: 65
End: 2025-06-18
Payer: MEDICARE

## 2025-06-18 VITALS
WEIGHT: 149.8 LBS | BODY MASS INDEX: 26.54 KG/M2 | SYSTOLIC BLOOD PRESSURE: 116 MMHG | HEIGHT: 63 IN | OXYGEN SATURATION: 98 % | HEART RATE: 60 BPM | DIASTOLIC BLOOD PRESSURE: 68 MMHG

## 2025-06-18 DIAGNOSIS — E21.0 PRIMARY HYPERPARATHYROIDISM: Primary | ICD-10-CM

## 2025-06-18 PROBLEM — Z78.0 MENOPAUSE: Status: ACTIVE | Noted: 2025-06-18

## 2025-06-18 LAB
25(OH)D3 SERPL-MCNC: 57.8 NG/ML (ref 30–100)
ALBUMIN SERPL-MCNC: 4.6 G/DL (ref 3.5–5.2)
ANION GAP SERPL CALCULATED.3IONS-SCNC: 11 MMOL/L (ref 5–15)
BUN SERPL-MCNC: 10 MG/DL (ref 8–23)
BUN/CREAT SERPL: 14.7 (ref 7–25)
CALCIUM SPEC-SCNC: 10.7 MG/DL (ref 8.6–10.5)
CHLORIDE SERPL-SCNC: 105 MMOL/L (ref 98–107)
CO2 SERPL-SCNC: 25 MMOL/L (ref 22–29)
CREAT SERPL-MCNC: 0.68 MG/DL (ref 0.57–1)
EGFRCR SERPLBLD CKD-EPI 2021: 96.8 ML/MIN/1.73
GLUCOSE SERPL-MCNC: 100 MG/DL (ref 65–99)
PHOSPHATE SERPL-MCNC: 2.7 MG/DL (ref 2.5–4.5)
POTASSIUM SERPL-SCNC: 4.7 MMOL/L (ref 3.5–5.2)
PTH-INTACT SERPL-MCNC: 49.6 PG/ML (ref 15–65)
SODIUM SERPL-SCNC: 141 MMOL/L (ref 136–145)

## 2025-06-18 PROCEDURE — 83970 ASSAY OF PARATHORMONE: CPT | Performed by: INTERNAL MEDICINE

## 2025-06-18 PROCEDURE — 82652 VIT D 1 25-DIHYDROXY: CPT | Performed by: INTERNAL MEDICINE

## 2025-06-18 PROCEDURE — 80069 RENAL FUNCTION PANEL: CPT | Performed by: INTERNAL MEDICINE

## 2025-06-18 PROCEDURE — 82306 VITAMIN D 25 HYDROXY: CPT | Performed by: INTERNAL MEDICINE

## 2025-06-18 NOTE — ASSESSMENT & PLAN NOTE
She appears to have primary hyperparathyroidism.  I would like to check further labs to confirm this.   She isn't particularly symptomatic.  We discussed indications for surgical intervention.  Will collect 24 hour urine calcium and schedule DEXA.  Will contact her with results.  If labs are okay, will observe for now.

## 2025-06-18 NOTE — PROGRESS NOTES
"     Office Note      Date: 2025  Patient Name: Kandice Manning  MRN: [id number]  : 1960    Chief Complaint   Patient presents with    Hyperparathyroidism       History of Present Illness:   Kandice Manning is a 65 y.o. female who presents for Hyperparathyroidism    She has h/o mild hypercalcemia dating back to 2022.  The serum calcium was 10.5 (ULN 10.2) at that time.  She isn't taking vit D or calcium supplement.  She hasn't taken thiazide diuretics.  She notes some nocturia.  Labs from 2024 showed calcium of 10.6.  Creatinine has been normal.  She denies any h/o kidney disease.  She has h/o single kidney stone that passed spontaneously about 4 years ago.  Labs from 2025 showed calcium of 10.8.  The intact PTH was 78.2 (ULN 65).  She notes constipation.  She notes some fatigue.  She denies any bone fractures.  She had DEXA done but it has been more than 2 years.      Subjective      Review of Systems:   Review of Systems   Constitutional:  Positive for chills, diaphoresis and fatigue.   HENT: Negative.     Eyes: Negative.    Respiratory: Negative.     Cardiovascular: Negative.    Gastrointestinal:  Positive for vomiting.        Heartburn/Reflux   Endocrine: Positive for polyuria.   Genitourinary: Negative.    Musculoskeletal:  Positive for myalgias.   Skin:         Hair Loss   Allergic/Immunologic: Negative.    Neurological: Negative.    Hematological: Negative.    Psychiatric/Behavioral:  The patient is nervous/anxious.        The following portions of the patient's history were reviewed and updated as appropriate: allergies, current medications, past family history, past medical history, past social history, past surgical history, and problem list.    Objective     Visit Vitals  /68 (BP Location: Left arm, Patient Position: Sitting, Cuff Size: Adult)   Pulse 60   Ht 160 cm (62.99\")   Wt 67.9 kg (149 lb 12.8 oz)   SpO2 98%   BMI 26.54 kg/m²       Physical Exam:  Physical " "Exam  Constitutional:       Appearance: Normal appearance.   HENT:      Head: Normocephalic and atraumatic.   Eyes:      Extraocular Movements: Extraocular movements intact.      Conjunctiva/sclera: Conjunctivae normal.   Neck:      Thyroid: No thyroid mass, thyromegaly or thyroid tenderness.   Cardiovascular:      Rate and Rhythm: Normal rate and regular rhythm.      Pulses: Normal pulses.      Heart sounds: Normal heart sounds.   Pulmonary:      Effort: Pulmonary effort is normal.      Breath sounds: Normal breath sounds.   Abdominal:      General: Bowel sounds are normal.      Palpations: Abdomen is soft.   Musculoskeletal:         General: Normal range of motion.      Cervical back: Normal range of motion and neck supple.   Lymphadenopathy:      Cervical: No cervical adenopathy.   Skin:     General: Skin is warm and dry.   Neurological:      General: No focal deficit present.      Mental Status: She is alert.   Psychiatric:         Mood and Affect: Mood normal.         Behavior: Behavior normal.         Thought Content: Thought content normal.         Judgment: Judgment normal.       Labs:    TSH  No results found for: \"TSHBASE\"     Free T4  No results found for: \"FREET4\"    T3  No results found for: \"Q2GLXVM\"      TPO  No results found for: \"THYROIDAB\"    TG AB  No results found for: \"THGAB\"    TG  No results found for: \"THYROGLB\"    CBC w/DIFF  No results found for: \"WBC\", \"RBC\", \"HGB\", \"HCT\", \"MCV\", \"MCH\", \"MCHC\", \"RDW\", \"RDWSD\", \"MPV\", \"PLT\", \"NEUTRORELPCT\", \"LYMPHORELPCT\", \"MONORELPCT\", \"EOSRELPCT\", \"BASORELPCT\", \"AUTOIGPER\", \"NEUTROABS\", \"LYMPHSABS\", \"MONOSABS\", \"EOSABS\", \"BASOSABS\", \"AUTOIGNUM\", \"NRBC\"        Assessment / Plan      Assessment & Plan:  Diagnoses and all orders for this visit:    1. Primary hyperparathyroidism (Primary)  Assessment & Plan:  She appears to have primary hyperparathyroidism.  I would like to check further labs to confirm this.   She isn't particularly symptomatic.  We " discussed indications for surgical intervention.  Will collect 24 hour urine calcium and schedule DEXA.  Will contact her with results.  If labs are okay, will observe for now.    Orders:  -     Renal Function Panel; Future  -     PTH, Intact; Future  -     Vitamin D,25-Hydroxy; Future  -     Calcitriol (1,25 di-OH Vitamin D); Future  -     Calcium, Urine, 24 Hour - Urine, Clean Catch; Future  -     DEXA Bone Density Axial; Future      Current Outpatient Medications   Medication Instructions    apixaban (ELIQUIS) 5 mg, Oral, 2 Times Daily    benzonatate (TESSALON) 200 mg, Oral, 3 Times Daily PRN    cetirizine (ZYRTEC) 25 mg, As Needed    estradiol (ESTRACE) 0.1 MG/GM vaginal cream INSERT 1 GRAM VAGINALLY 1 TIME A WEEK FOR 2 WEEKS THEN USE VAGINALLY 2 TIMES A WEEK    fluticasone (FLONASE) 50 MCG/ACT nasal spray As needed    LINZESS 145 MCG capsule capsule TK 1 C PO D    nebivolol (BYSTOLIC) 2.5 mg, Oral, Daily    neomycin-polymyxin-hydrocortisone (CORTISPORIN) 3.5-58354-8 otic suspension     pantoprazole (PROTONIX) 40 MG EC tablet Daily    rosuvastatin (CRESTOR) 5 mg, Oral, Nightly    triamcinolone (KENALOG) 0.1 % cream 2 Times Daily PRN      Return in about 6 months (around 12/18/2025) for Recheck with CMP, PTH.    Electronically signed by: Bal Escoto MD  06/18/2025

## 2025-06-23 ENCOUNTER — LAB (OUTPATIENT)
Dept: ENDOCRINOLOGY | Facility: CLINIC | Age: 65
End: 2025-06-23
Payer: MEDICARE

## 2025-06-23 DIAGNOSIS — E21.0 PRIMARY HYPERPARATHYROIDISM: ICD-10-CM

## 2025-06-23 LAB
1,25(OH)2D SERPL-MCNC: 51 PG/ML (ref 24.8–81.5)
CALCIUM 24H UR-MCNC: 9.4 MG/DL
CALCIUM 24H UR-MRATE: 166.9 MG/24 HR (ref 100–300)
COLLECT DURATION TIME UR: 24 HRS
SPECIMEN VOL 24H UR: 1775 ML

## 2025-06-23 PROCEDURE — 82340 ASSAY OF CALCIUM IN URINE: CPT | Performed by: INTERNAL MEDICINE

## 2025-06-23 PROCEDURE — 81050 URINALYSIS VOLUME MEASURE: CPT | Performed by: INTERNAL MEDICINE

## 2025-07-23 ENCOUNTER — HOSPITAL ENCOUNTER (OUTPATIENT)
Dept: BONE DENSITY | Facility: HOSPITAL | Age: 65
Discharge: HOME | End: 2025-07-23
Admitting: INTERNAL MEDICINE
Payer: MEDICARE

## 2025-07-23 DIAGNOSIS — E21.0 PRIMARY HYPERPARATHYROIDISM: ICD-10-CM

## 2025-07-23 DIAGNOSIS — E21.0 PRIMARY HYPERPARATHYROIDISM: Primary | ICD-10-CM

## 2025-07-23 PROBLEM — M81.0 AGE-RELATED OSTEOPOROSIS WITHOUT CURRENT PATHOLOGICAL FRACTURE: Status: ACTIVE | Noted: 2025-07-23

## 2025-07-23 PROCEDURE — 77080 DXA BONE DENSITY AXIAL: CPT
